# Patient Record
Sex: FEMALE | Race: WHITE | NOT HISPANIC OR LATINO | URBAN - METROPOLITAN AREA
[De-identification: names, ages, dates, MRNs, and addresses within clinical notes are randomized per-mention and may not be internally consistent; named-entity substitution may affect disease eponyms.]

---

## 2017-04-10 ENCOUNTER — VIRTUAL VISIT (OUTPATIENT)
Dept: FAMILY MEDICINE | Facility: OTHER | Age: 42
End: 2017-04-10

## 2017-04-10 NOTE — PROGRESS NOTES
Date: [id number]  Clinician: Joel Wegener  Clinician NPI: [id number]  Patient: Yolanda Meyer  Patient : 1975  Patient Address: [address] [place] [address]  Patient Phone: [phone]  Visit Protocol: Motion sickness  Patient Summary:  Yolanda is a 42 year old ( : 1975 } female who initiated a Zip for motion sickness prevention.      She plans to travel by airplane and travel by boat or sea. Treatment is requested for 3-4 days. During this time Yolanda expects to snorkel.  Yolanda has experienced motion sickness in the past while traveling by plane and boating or traveling by sea.   Yolanda has been previously treated for motion sickness. The previous bouts of motion sickness have included nausea or vomiting and cold sweats or sweating.   She does not have a history of migraines. Yolanda denies hypertension.   The patient denies taking potassium or vitamin c supplements. She also denies glaucoma, currently experiencing dizziness, nausea, a recent concussion or head injury, tinnitus, previous diagnosis or treatment for stroke or neurological disorder, and generally feeling ill on a regular basis.   Yolanda does not smoke or use smokeless tobacco She denies pregnancy and is Not currently breastfeeding.    Previous Medications:  Dramamine (dimenhydrinate) was effective.    Drug Preferences:  Prefers to NOT be treated with Dramamine (dimenhydrinate).   MEDICATIONS:  Paroxetine (Paxil, Pexeva)   , ALLERGIES:   sulfa (Bactrim/Septra)   Patient free text response:   None    Clinician Response:  Dear Yolanda,   Based upon the information you provided, you most likely have Motion Sickness.   I am prescribing:   Meclizine (Antivert). Take 1-2 tablets by mouth one time a day 1 hour before travel or event.   Once motion sickness symptoms start, it can take 36-72 hours to feel better, even if the motion has stopped.   It is easier to prevent motion sickness, than to relieve the symptoms after  they start. For prevention or mild symptoms try the following:     Eat a few dry soda crackers.    Sip on small amounts of clear, carbonated beverages such as ginger ale    Get fresh air    Lie down or keep your head still    Sit or lay in the most stable area of the vehicle. In a plane, try to sit over a wing. When in a car, you should be the . If you cannot drive, you should be the front seat passenger and concentrate on the horizon. When traveling by ship, the most stable area is near the middle, so try to book a cabin in that area. You should also stay on the deck looking at the horizon or lay down your cabin or in another area near the center of the ship.     I recommend:   Wearing an acupressure band, like a Sea-band or psi-Band. This may help with motion sickness symptoms such as nausea and vomiting. Do NOT combine over-the-counter motion-sickness medications with your prescription medications. Please seek medical attention if you have taken steps to help prevent motion sickness and you develop active motion sickness, your motion sickness does not resolve, you develop new or concerning symptoms (such as headache, weakness), or you develop nausea and vomiting and you are unable to keep down fluids.   If you become pregnant during this course of treatment with medication, stop taking the medication and contact your primary care provider.   Diagnosis: Motion sickness  Diagnosis ICD: T75.3XXA  Prescription: meclizine (Antivert) 25mg oral 30 tablets, 30 days supply. Take one to two tablets by mouth one time a day 1 hour before travel or event. Refills: 0, Refill as needed: no, Allow substitutions: yes  Prescription Sent At: April 10 10:18:37, 2017  Pharmacy: [facility]/pharmacy #[id number] - [phone] - [address] [place] [address]

## 2018-05-25 ENCOUNTER — RECORDS - HEALTHEAST (OUTPATIENT)
Dept: ADMINISTRATIVE | Facility: OTHER | Age: 43
End: 2018-05-25

## 2019-08-22 ENCOUNTER — COMMUNICATION - HEALTHEAST (OUTPATIENT)
Dept: TELEHEALTH | Facility: CLINIC | Age: 44
End: 2019-08-22

## 2019-08-22 ENCOUNTER — RECORDS - HEALTHEAST (OUTPATIENT)
Dept: MAMMOGRAPHY | Facility: CLINIC | Age: 44
End: 2019-08-22

## 2019-08-22 DIAGNOSIS — Z12.31 ENCOUNTER FOR SCREENING MAMMOGRAM FOR MALIGNANT NEOPLASM OF BREAST: ICD-10-CM

## 2020-07-04 ENCOUNTER — VIRTUAL VISIT (OUTPATIENT)
Dept: FAMILY MEDICINE | Facility: OTHER | Age: 45
End: 2020-07-04

## 2020-07-04 NOTE — PROGRESS NOTES
"Date: 2020 15:04:37  Clinician: Landen Eid  Clinician NPI: [id number]  Patient: Yolanda Meyer  Patient : 1975  Patient Address: [address] [place] [address]  Patient Phone: [phone]  Visit Protocol: URI  Patient Summary:  Yolanda is a 45 year old ( : 1975 ) female who initiated a Visit for COVID-19 (Coronavirus) evaluation and screening. When asked the question \"Please sign me up to receive news, health information and promotions from [employer].\", Yolanda responded \"No\".    Yolanda states her symptoms started today.   Her symptoms consist of malaise, chills, and a sore throat. Yolanda also feels feverish.   Symptom details     Sore throat: Yolanda reports having mild throat pain (1-3 on a 10 point pain scale), does not have exudate on her tonsils, and can swallow liquids. She is not sure if the lymph nodes in her neck are enlarged. A rash has not appeared on the skin since the sore throat started.     Temperature: Her current temperature is 98.0 degrees Fahrenheit.      Yolanda denies having wheezing, nausea, teeth pain, ageusia, diarrhea, vomiting, rhinitis, ear pain, headache, myalgias, anosmia, facial pain or pressure, cough, and nasal congestion. She also denies having recent facial or sinus surgery in the past 60 days and taking antibiotic medication in the past month. She is not experiencing dyspnea.   Precipitating events  Within the past week, Yolanda has not been exposed to someone with strep throat. She has not recently been exposed to someone with influenza. Yolanda has been in close contact with the following high risk individuals: immunocompromised people.   Pertinent COVID-19 (Coronavirus) information  In the past 14 days, Yolanda has not worked in a congregate living setting.   She either works or volunteers as a healthcare worker or a , or works or volunteers in a healthcare facility. 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 She lives with a healthcare worker.   Yolanda has had a close contact with a laboratory-confirmed COVID-19 patient within 14 days of symptom onset. She was not exposed at her work. Additional information about contact with COVID-19 (Coronavirus) patient as reported by the patient (free text): Covid positive patients in our unit- has proper PPE on   Pertinent medical history  Yolanda does not get yeast infections when she takes antibiotics.   Yolanda does not need a return to work/school note.   Weight: 120 lbs   Yolanda does not smoke or use smokeless tobacco.   She denies pregnancy and denies breastfeeding. She has menstruated in the past month.   Weight: 120 lbs    MEDICATIONS: Paxil oral, ALLERGIES: Sulfa (Sulfonamide Antibiotics)  Clinician Response:  Dear Yolanda,   Your symptoms show that you may have coronavirus (COVID-19). This illness can cause fever, cough and trouble breathing. Many people get a mild case and get better on their own. Some people can get very sick.  What should I do?  We would like to test you for this virus.   1. Please call [phone] to schedule your visit. Explain that you were referred by [facility] to have a COVID-19 test. Be ready to share your [facility] visit ID number.  The following will serve as your written order for this COVID Test, ordered by me, for the indication of suspected COVID [Z20.828]: The test will be ordered in [employer], our electronic health record, after you are scheduled. It will show as ordered and authorized by Dino Powers MD.  Order: COVID-19 (Coronavirus) PCR for SYMPTOMATIC testing from [facility].      2. When it's time for your COVID test:  Stay at least 6 feet away from others. (If someone will drive you to your test, stay in the backseat, as far away from the  as you can.)   Cover your mouth and nose with a mask, tissue or  "washcloth.  Go straight to the testing site. Don't make any stops on the way there or back.      3.Starting now: Stay home and away from others (self-isolate) until:   You've had no fever---and no medicine that reduces fever---for 3 full days (72 hours). And...   Your other symptoms have gotten better. For example, your cough or breathing has improved. And...   At least 10 days have passed since your symptoms started.       During this time, don't leave the house except for testing or medical care.   Stay in your own room, even for meals. Use your own bathroom if you can.   Stay away from others in your home. No hugging, kissing or shaking hands. No visitors.  Don't go to work, school or anywhere else.    Clean \"high touch\" surfaces often (doorknobs, counters, handles, etc.). Use a household cleaning spray or wipes. You'll find a full list of  on the EPA website: www.epa.gov/pesticide-registration/list-n-disinfectants-use-against-sars-cov-2.   Cover your mouth and nose with a mask, tissue or washcloth to avoid spreading germs.  Wash your hands and face often. Use soap and water.  Caregivers in these groups are at risk for severe illness due to COVID-19:  o People 65 years and older  o People who live in a nursing home or long-term care facility  o People with chronic disease (lung, heart, cancer, diabetes, kidney, liver, immunologic)  o People who have a weakened immune system, including those who:   Are in cancer treatment  Take medicine that weakens the immune system, such as corticosteroids  Had a bone marrow or organ transplant  Have an immune deficiency  Have poorly controlled HIV or AIDS  Are obese (body mass index of 40 or higher)  Smoke regularly   o Caregivers should wear gloves while washing dishes, handling laundry and cleaning bedrooms and bathrooms.  o Use caution when washing and drying laundry: Don't shake dirty laundry, and use the warmest water setting that you can.  o For more tips, go to " www.cdc.gov/coronavirus/2019-ncov/downloads/10Things.pdf.    4.Sign up for [employer]. We know it's scary to hear that you might have COVID-19. We want to track your symptoms to make sure you're okay over the next 2 weeks. Please look for an email from [employer]---this is a free, online program that we'll use to keep in touch. To sign up, follow the link in the email. Learn more at [email or web address]  How can I take care of myself?   Get lots of rest. Drink extra fluids (unless a doctor has told you not to).   Take Tylenol (acetaminophen) for fever or pain. If you have liver or kidney problems, ask your family doctor if it's okay to take Tylenol.   Adults can take either:    650 mg (two 325 mg pills) every 4 to 6 hours, or...   1,000 mg (two 500 mg pills) every 8 hours as needed.    Note: Don't take more than 3,000 mg in one day. Acetaminophen is found in many medicines (both prescribed and over-the-counter medicines). Read all labels to be sure you don't take too much.   For children, check the Tylenol bottle for the right dose. The dose is based on the child's age or weight.    If you have other health problems (like cancer, heart failure, an organ transplant or severe kidney disease): Call your specialty clinic if you don't feel better in the next 2 days.       Know when to call 911. Emergency warning signs include:    Trouble breathing or shortness of breath Pain or pressure in the chest that doesn't go away Feeling confused like you haven't felt before, or not being able to wake up Bluish-colored lips or face.  Where can I get more information?    [facility] -- About COVID-19: [email or web address]   CDC -- What to Do If You're Sick: www.cdc.gov/coronavirus/2019-ncov/about/steps-when-sick.html   CDC -- Ending Home Isolation: www.cdc.gov/coronavirus/2019-ncov/hcp/disposition-in-home-patients.html   CDC -- Caring for Someone:  www.cdc.gov/coronavirus/2019-ncov/if-you-are-sick/care-for-someone.html   [facility] -- Interim Guidance for Hospital Discharge to Home: www.health.[facility].[place].us/diseases/coronavirus/hcp/hospdischarge.pdf   [facility] clinical trials (COVID-19 research studies): clinicalaffairs.[facility]-clinical-trials    Below are the COVID-19 hotlines at the [place] Department of Health ([facility]). Interpreters are available.    For health questions: Call 257-037-3906 or 1-296.195.1678 (7 a.m. to 7 p.m.) For questions about schools and childcare: Call 218-816-8502 or 1-506.564.1121 (7 a.m. to 7 p.m.)    Diagnosis: Other malaise  Diagnosis ICD: R53.81

## 2021-01-22 ENCOUNTER — HOSPITAL ENCOUNTER (OUTPATIENT)
Dept: MAMMOGRAPHY | Facility: CLINIC | Age: 46
Discharge: HOME | End: 2021-01-22
Attending: OBSTETRICS & GYNECOLOGY

## 2021-01-22 DIAGNOSIS — Z12.31 VISIT FOR SCREENING MAMMOGRAM: ICD-10-CM

## 2021-06-05 ENCOUNTER — HEALTH MAINTENANCE LETTER (OUTPATIENT)
Age: 46
End: 2021-06-05

## 2021-09-25 ENCOUNTER — HEALTH MAINTENANCE LETTER (OUTPATIENT)
Age: 46
End: 2021-09-25

## 2022-05-01 ENCOUNTER — HEALTH MAINTENANCE LETTER (OUTPATIENT)
Age: 47
End: 2022-05-01

## 2022-06-26 ENCOUNTER — HEALTH MAINTENANCE LETTER (OUTPATIENT)
Age: 47
End: 2022-06-26

## 2022-12-26 ENCOUNTER — HEALTH MAINTENANCE LETTER (OUTPATIENT)
Age: 47
End: 2022-12-26

## 2023-02-09 ENCOUNTER — LAB REQUISITION (OUTPATIENT)
Dept: LAB | Facility: CLINIC | Age: 48
End: 2023-02-09

## 2023-02-09 DIAGNOSIS — R53.83 OTHER FATIGUE: ICD-10-CM

## 2023-02-09 DIAGNOSIS — N95.1 MENOPAUSAL AND FEMALE CLIMACTERIC STATES: ICD-10-CM

## 2023-02-09 LAB
FSH SERPL IRP2-ACNC: 3.7 MIU/ML
TSH SERPL DL<=0.005 MIU/L-ACNC: 1.61 UIU/ML (ref 0.3–4.2)

## 2023-02-09 PROCEDURE — 84443 ASSAY THYROID STIM HORMONE: CPT | Performed by: OBSTETRICS & GYNECOLOGY

## 2023-02-09 PROCEDURE — 83001 ASSAY OF GONADOTROPIN (FSH): CPT | Performed by: OBSTETRICS & GYNECOLOGY

## 2023-03-02 ENCOUNTER — LAB REQUISITION (OUTPATIENT)
Dept: LAB | Facility: CLINIC | Age: 48
End: 2023-03-02

## 2023-03-02 DIAGNOSIS — E78.5 HYPERLIPIDEMIA, UNSPECIFIED: ICD-10-CM

## 2023-03-02 DIAGNOSIS — M25.50 PAIN IN UNSPECIFIED JOINT: ICD-10-CM

## 2023-03-02 DIAGNOSIS — Z86.19 PERSONAL HISTORY OF OTHER INFECTIOUS AND PARASITIC DISEASES: ICD-10-CM

## 2023-03-02 DIAGNOSIS — R53.83 OTHER FATIGUE: ICD-10-CM

## 2023-03-02 LAB
ALBUMIN SERPL BCG-MCNC: 4.3 G/DL (ref 3.5–5.2)
ALP SERPL-CCNC: 50 U/L (ref 35–104)
ALT SERPL W P-5'-P-CCNC: 9 U/L (ref 10–35)
ANION GAP SERPL CALCULATED.3IONS-SCNC: 9 MMOL/L (ref 7–15)
AST SERPL W P-5'-P-CCNC: 20 U/L (ref 10–35)
BASOPHILS # BLD AUTO: 0 10E3/UL (ref 0–0.2)
BASOPHILS NFR BLD AUTO: 0 %
BILIRUB SERPL-MCNC: 0.9 MG/DL
BUN SERPL-MCNC: 12.8 MG/DL (ref 6–20)
CALCIUM SERPL-MCNC: 9.4 MG/DL (ref 8.6–10)
CHLORIDE SERPL-SCNC: 102 MMOL/L (ref 98–107)
CHOLEST SERPL-MCNC: 206 MG/DL
CREAT SERPL-MCNC: 0.68 MG/DL (ref 0.51–0.95)
CRP SERPL-MCNC: <3 MG/L
DEPRECATED HCO3 PLAS-SCNC: 26 MMOL/L (ref 22–29)
EOSINOPHIL # BLD AUTO: 0 10E3/UL (ref 0–0.7)
EOSINOPHIL NFR BLD AUTO: 1 %
ERYTHROCYTE [DISTWIDTH] IN BLOOD BY AUTOMATED COUNT: 11.9 % (ref 10–15)
ERYTHROCYTE [SEDIMENTATION RATE] IN BLOOD BY WESTERGREN METHOD: 6 MM/HR (ref 0–20)
FERRITIN SERPL-MCNC: 119 NG/ML (ref 6–175)
GFR SERPL CREATININE-BSD FRML MDRD: >90 ML/MIN/1.73M2
GLUCOSE SERPL-MCNC: 105 MG/DL (ref 70–99)
HBA1C MFR BLD: 5.5 %
HCT VFR BLD AUTO: 42.8 % (ref 35–47)
HDLC SERPL-MCNC: 52 MG/DL
HGB BLD-MCNC: 14.6 G/DL (ref 11.7–15.7)
HOLD SPECIMEN: NORMAL
HOLD SPECIMEN: NORMAL
IMM GRANULOCYTES # BLD: 0 10E3/UL
IMM GRANULOCYTES NFR BLD: 1 %
LDLC SERPL CALC-MCNC: 141 MG/DL
LYMPHOCYTES # BLD AUTO: 1.3 10E3/UL (ref 0.8–5.3)
LYMPHOCYTES NFR BLD AUTO: 34 %
MCH RBC QN AUTO: 33.7 PG (ref 26.5–33)
MCHC RBC AUTO-ENTMCNC: 34.1 G/DL (ref 31.5–36.5)
MCV RBC AUTO: 99 FL (ref 78–100)
MONOCYTES # BLD AUTO: 0.3 10E3/UL (ref 0–1.3)
MONOCYTES NFR BLD AUTO: 7 %
NEUTROPHILS # BLD AUTO: 2.1 10E3/UL (ref 1.6–8.3)
NEUTROPHILS NFR BLD AUTO: 57 %
NONHDLC SERPL-MCNC: 154 MG/DL
NRBC # BLD AUTO: 0 10E3/UL
NRBC BLD AUTO-RTO: 0 /100
PLATELET # BLD AUTO: 210 10E3/UL (ref 150–450)
POTASSIUM SERPL-SCNC: 4.5 MMOL/L (ref 3.4–5.3)
PROT SERPL-MCNC: 6.7 G/DL (ref 6.4–8.3)
RBC # BLD AUTO: 4.33 10E6/UL (ref 3.8–5.2)
SODIUM SERPL-SCNC: 137 MMOL/L (ref 136–145)
TRIGL SERPL-MCNC: 65 MG/DL
WBC # BLD AUTO: 3.7 10E3/UL (ref 4–11)

## 2023-03-02 PROCEDURE — 80061 LIPID PANEL: CPT | Performed by: OBSTETRICS & GYNECOLOGY

## 2023-03-02 PROCEDURE — 80053 COMPREHEN METABOLIC PANEL: CPT | Performed by: OBSTETRICS & GYNECOLOGY

## 2023-03-02 PROCEDURE — 83036 HEMOGLOBIN GLYCOSYLATED A1C: CPT | Performed by: OBSTETRICS & GYNECOLOGY

## 2023-03-02 PROCEDURE — 85652 RBC SED RATE AUTOMATED: CPT | Performed by: OBSTETRICS & GYNECOLOGY

## 2023-03-02 PROCEDURE — 82728 ASSAY OF FERRITIN: CPT | Performed by: OBSTETRICS & GYNECOLOGY

## 2023-03-02 PROCEDURE — 85025 COMPLETE CBC W/AUTO DIFF WBC: CPT | Performed by: OBSTETRICS & GYNECOLOGY

## 2023-03-02 PROCEDURE — 86140 C-REACTIVE PROTEIN: CPT | Performed by: OBSTETRICS & GYNECOLOGY

## 2023-04-13 ENCOUNTER — TRANSFERRED RECORDS (OUTPATIENT)
Dept: HEALTH INFORMATION MANAGEMENT | Facility: CLINIC | Age: 48
End: 2023-04-13

## 2023-06-02 ENCOUNTER — HEALTH MAINTENANCE LETTER (OUTPATIENT)
Age: 48
End: 2023-06-02

## 2023-07-09 ENCOUNTER — HEALTH MAINTENANCE LETTER (OUTPATIENT)
Age: 48
End: 2023-07-09

## 2024-02-08 RX ORDER — PAROXETINE 10 MG/1
10 TABLET, FILM COATED ORAL EVERY MORNING
COMMUNITY

## 2024-02-09 ENCOUNTER — ANESTHESIA EVENT (OUTPATIENT)
Dept: SURGERY | Facility: AMBULATORY SURGERY CENTER | Age: 49
End: 2024-02-09
Payer: COMMERCIAL

## 2024-02-12 ENCOUNTER — HOSPITAL ENCOUNTER (OUTPATIENT)
Facility: AMBULATORY SURGERY CENTER | Age: 49
Discharge: HOME | End: 2024-02-12
Attending: COLON & RECTAL SURGERY
Payer: COMMERCIAL

## 2024-02-12 ENCOUNTER — ANESTHESIA (OUTPATIENT)
Dept: SURGERY | Facility: AMBULATORY SURGERY CENTER | Age: 49
End: 2024-02-12
Payer: COMMERCIAL

## 2024-02-12 VITALS
OXYGEN SATURATION: 97 % | BODY MASS INDEX: 22.15 KG/M2 | HEART RATE: 55 BPM | TEMPERATURE: 97.6 F | HEIGHT: 63 IN | WEIGHT: 125 LBS | SYSTOLIC BLOOD PRESSURE: 115 MMHG | DIASTOLIC BLOOD PRESSURE: 72 MMHG | RESPIRATION RATE: 16 BRPM

## 2024-02-12 RX ORDER — LIDOCAINE HYDROCHLORIDE 10 MG/ML
INJECTION, SOLUTION EPIDURAL; INFILTRATION; INTRACAUDAL; PERINEURAL PRN
Status: DISCONTINUED | OUTPATIENT
Start: 2024-02-12 | End: 2024-02-12 | Stop reason: HOSPADM

## 2024-02-12 RX ORDER — OXYCODONE HYDROCHLORIDE 10 MG/1
10 TABLET ORAL
Status: DISCONTINUED | OUTPATIENT
Start: 2024-02-12 | End: 2024-02-13 | Stop reason: HOSPADM

## 2024-02-12 RX ORDER — ONDANSETRON 2 MG/ML
INJECTION INTRAMUSCULAR; INTRAVENOUS PRN
Status: DISCONTINUED | OUTPATIENT
Start: 2024-02-12 | End: 2024-02-12

## 2024-02-12 RX ORDER — ONDANSETRON 4 MG/1
4 TABLET, ORALLY DISINTEGRATING ORAL EVERY 30 MIN PRN
Status: DISCONTINUED | OUTPATIENT
Start: 2024-02-12 | End: 2024-02-13 | Stop reason: HOSPADM

## 2024-02-12 RX ORDER — LIDOCAINE 40 MG/G
CREAM TOPICAL
Status: DISCONTINUED | OUTPATIENT
Start: 2024-02-12 | End: 2024-02-13 | Stop reason: HOSPADM

## 2024-02-12 RX ORDER — PROPOFOL 10 MG/ML
INJECTION, EMULSION INTRAVENOUS CONTINUOUS PRN
Status: DISCONTINUED | OUTPATIENT
Start: 2024-02-12 | End: 2024-02-12

## 2024-02-12 RX ORDER — FENTANYL CITRATE 50 UG/ML
INJECTION, SOLUTION INTRAMUSCULAR; INTRAVENOUS PRN
Status: DISCONTINUED | OUTPATIENT
Start: 2024-02-12 | End: 2024-02-12

## 2024-02-12 RX ORDER — ONDANSETRON 4 MG/1
4 TABLET, ORALLY DISINTEGRATING ORAL
Status: DISCONTINUED | OUTPATIENT
Start: 2024-02-12 | End: 2024-02-13 | Stop reason: HOSPADM

## 2024-02-12 RX ORDER — ACETAMINOPHEN 325 MG/1
975 TABLET ORAL ONCE
Status: COMPLETED | OUTPATIENT
Start: 2024-02-12 | End: 2024-02-12

## 2024-02-12 RX ORDER — FENTANYL CITRATE 0.05 MG/ML
25 INJECTION, SOLUTION INTRAMUSCULAR; INTRAVENOUS
Status: DISCONTINUED | OUTPATIENT
Start: 2024-02-12 | End: 2024-02-13 | Stop reason: HOSPADM

## 2024-02-12 RX ORDER — ONDANSETRON 2 MG/ML
4 INJECTION INTRAMUSCULAR; INTRAVENOUS EVERY 30 MIN PRN
Status: DISCONTINUED | OUTPATIENT
Start: 2024-02-12 | End: 2024-02-13 | Stop reason: HOSPADM

## 2024-02-12 RX ORDER — OXYCODONE HYDROCHLORIDE 5 MG/1
5 TABLET ORAL
Status: DISCONTINUED | OUTPATIENT
Start: 2024-02-12 | End: 2024-02-13 | Stop reason: HOSPADM

## 2024-02-12 RX ORDER — PROPOFOL 10 MG/ML
INJECTION, EMULSION INTRAVENOUS PRN
Status: DISCONTINUED | OUTPATIENT
Start: 2024-02-12 | End: 2024-02-12

## 2024-02-12 RX ORDER — SODIUM CHLORIDE, SODIUM LACTATE, POTASSIUM CHLORIDE, CALCIUM CHLORIDE 600; 310; 30; 20 MG/100ML; MG/100ML; MG/100ML; MG/100ML
INJECTION, SOLUTION INTRAVENOUS CONTINUOUS
Status: DISCONTINUED | OUTPATIENT
Start: 2024-02-12 | End: 2024-02-13 | Stop reason: HOSPADM

## 2024-02-12 RX ORDER — DEXAMETHASONE SODIUM PHOSPHATE 4 MG/ML
INJECTION, SOLUTION INTRA-ARTICULAR; INTRALESIONAL; INTRAMUSCULAR; INTRAVENOUS; SOFT TISSUE PRN
Status: DISCONTINUED | OUTPATIENT
Start: 2024-02-12 | End: 2024-02-12

## 2024-02-12 RX ADMIN — ONDANSETRON 4 MG: 2 INJECTION INTRAMUSCULAR; INTRAVENOUS at 10:28

## 2024-02-12 RX ADMIN — SODIUM CHLORIDE, SODIUM LACTATE, POTASSIUM CHLORIDE, CALCIUM CHLORIDE: 600; 310; 30; 20 INJECTION, SOLUTION INTRAVENOUS at 09:10

## 2024-02-12 RX ADMIN — DEXAMETHASONE SODIUM PHOSPHATE 4 MG: 4 INJECTION, SOLUTION INTRA-ARTICULAR; INTRALESIONAL; INTRAMUSCULAR; INTRAVENOUS; SOFT TISSUE at 10:28

## 2024-02-12 RX ADMIN — PROPOFOL 40 MG: 10 INJECTION, EMULSION INTRAVENOUS at 10:19

## 2024-02-12 RX ADMIN — ACETAMINOPHEN 975 MG: 325 TABLET ORAL at 08:59

## 2024-02-12 RX ADMIN — PROPOFOL 20 MG: 10 INJECTION, EMULSION INTRAVENOUS at 10:20

## 2024-02-12 RX ADMIN — PROPOFOL 20 MG: 10 INJECTION, EMULSION INTRAVENOUS at 10:22

## 2024-02-12 RX ADMIN — FENTANYL CITRATE 50 MCG: 50 INJECTION, SOLUTION INTRAMUSCULAR; INTRAVENOUS at 10:22

## 2024-02-12 RX ADMIN — PROPOFOL 200 MCG/KG/MIN: 10 INJECTION, EMULSION INTRAVENOUS at 10:19

## 2024-02-12 RX ADMIN — FENTANYL CITRATE 50 MCG: 50 INJECTION, SOLUTION INTRAMUSCULAR; INTRAVENOUS at 10:26

## 2024-02-12 ASSESSMENT — LIFESTYLE VARIABLES: TOBACCO_USE: 1

## 2024-02-12 NOTE — OP NOTE
COL[facility] OPERATIVE NOTE    DATE OF SERVICE: 2/12/2024      PROCEDURE NAME: Rectal exam under anesthesia with botox injection     PRE-PROCEDURE DIAGNOSIS: anal fissure, rectal pain     POST-PROCEDURE DIAGNOSIS: acute anal fissure, rectal pain     SURGEON: Lorie Powers MD     FINDINGS: very superficial anal fissure posterior     ESTIMATED BLOOD LOSS: 0mL     ANESTHESIA: mac     SPECIMEN: None.     INDICATIONS FOR PROCEDURE:  Yolanda Meyer is a 49 year old female   presenting with recurrent pain and symptoms from suspected fissure.  The  risks and benefits of repeat botox injection were discussed with patient and informed consent was obtained.         DESCRIPTION OF PROCEDURE:  The patient was taken to the operating room and placed under MAC anesthesia. SHe was then placed in the prone hermila knife position on the operating room table. The buttocks were taped apart. The surgical area was then prepped and draped in the usual sterile fashion. A timeout was performed per protocol. We then started with injection of local anesthetic with lidocaine. A digital rectal exam was performed which revealed no abnormalities.  The bivalve anoscope was inserted and a superficial posterior skin fissure was seen. There was no evidence of a chronic fissure. I then injected 100 units of botox on either side of the anterior midline in the intersphincteric groove.      Hemostasis was achieved.  All sponge, needle and instrument counts  were correct at the end of the procedure. The patient tolerated the procedure well  and was awakened from anesthesia without difficulty, and transferred to the recovery room in stable condition.    Lorie Powers MD, MS, FACS, FASCRS  [facility]  Office: [phone]  2/12/2024 10:48 AM

## 2024-02-12 NOTE — ANESTHESIA CARE TRANSFER NOTE
Patient: Yolanda Meyer    Procedure: Procedure(s):  EXAM UNDER ANESTHEISA WITH BOTOX INJECTION       Diagnosis: Anal fissure [K60.2]  Diagnosis Additional Information: No value filed.    Anesthesia Type:   MAC     Note:    Oropharynx: oropharynx clear of all foreign objects and spontaneously breathing  Level of Consciousness: awake and drowsy  Oxygen Supplementation: nasal cannula  Level of Supplemental Oxygen (L/min / FiO2): 4  Independent Airway: airway patency satisfactory and stable  Dentition: dentition unchanged  Vital Signs Stable: post-procedure vital signs reviewed and stable  Report to RN Given: handoff report given  Patient transferred to: Phase II    Handoff Report: Identifed the Patient, Identified the Reponsible Provider, Reviewed the pertinent medical history, Discussed the surgical course, Reviewed Intra-OP anesthesia mangement and issues during anesthesia, Set expectations for post-procedure period and Allowed opportunity for questions and acknowledgement of understanding      Vitals:  Vitals Value Taken Time   /58 02/12/24 1039   Temp 36.1  C (97  F) 02/12/24 1039   Pulse 50 02/12/24  1039   Resp 16 02/12/24 1039   SpO2 99 % 02/12/24 1039       Electronically Signed By: WILFREDO Campos CRNA  February 12, 2024  10:40 AM

## 2024-02-12 NOTE — H&P
History and Physical Update    The history and physical has been reviewed and the patient has been examined.  There are no interim changes to the patient's history or physical condition.    Lorie Powers MD, MS  [facility]  Office: [phone]  Pager: [phone]  2/12/2024 10:01 AM

## 2024-02-12 NOTE — ANESTHESIA PREPROCEDURE EVALUATION
Anesthesia Pre-Procedure Evaluation    Patient: Yolanda Meyer   MRN: [id number] : 1975        Procedure : Procedure(s):  EXAM UNDER ANESTHEISA WITH BOTOX INJECTION          Past Medical History:   Diagnosis Date    Motion sickness     Other chronic pain     Pneumonia     PONV (postoperative nausea and vomiting)       Past Surgical History:   Procedure Laterality Date    MAMMOPLASTY AUGMENTATION  2002    Original set, saline    RECTAL BOTOX INJECTION      x2    ZZC ENLARGE BREAST      Description: Breast Surgery Enlargement Procedure;  Recorded: 2008;      Allergies   Allergen Reactions    Sulfa (Sulfonamide Antibiotics) [Sulfa Antibiotics] Hives      Social History     Tobacco Use    Smoking status: Former     Types: Cigarettes     Quit date:      Years since quitting: 15.1    Smokeless tobacco: Never   Substance Use Topics    Alcohol use: Yes     Comment: Wine approx 5x/wk      Wt Readings from Last 1 Encounters:   24 56.7 kg (125 lb)        Anesthesia Evaluation   Pt has had prior anesthetic.     History of anesthetic complications  - motion sickness and PONV.      ROS/MED HX  ENT/Pulmonary:     (+)                tobacco use, Past use,                       Neurologic:  - neg neurologic ROS     Cardiovascular:  - neg cardiovascular ROS     METS/Exercise Tolerance: >4 METS    Hematologic:  - neg hematologic  ROS     Musculoskeletal:       GI/Hepatic:       Renal/Genitourinary:       Endo:  - neg endo ROS     Psychiatric/Substance Use:    (-) chronic opioid use history   Infectious Disease:  - neg infectious disease ROS     Malignancy:       Other:      (+)  , H/O Chronic Pain,         Physical Exam    Airway        Mallampati: III   TM distance: > 3 FB   Neck ROM: full   Mouth opening: > 3 cm    Respiratory Devices and Support         Dental       (+) Minor Abnormalities - some fillings, tiny chips      Cardiovascular          Rhythm and rate: regular     Pulmonary           breath  "sounds clear to auscultation           OUTSIDE LABS:  CBC:   Lab Results   Component Value Date    WBC 3.7 (L) 03/02/2023    HGB 14.6 03/02/2023    HCT 42.8 03/02/2023     03/02/2023     BMP:   Lab Results   Component Value Date     03/02/2023    POTASSIUM 4.5 03/02/2023    CHLORIDE 102 03/02/2023    CO2 26 03/02/2023    BUN 12.8 03/02/2023    CR 0.68 03/02/2023     (H) 03/02/2023     COAGS: No results found for: \"PTT\", \"INR\", \"FIBR\"  POC: No results found for: \"BGM\", \"HCG\", \"HCGS\"  HEPATIC:   Lab Results   Component Value Date    ALBUMIN 4.3 03/02/2023    PROTTOTAL 6.7 03/02/2023    ALT 9 (L) 03/02/2023    AST 20 03/02/2023    ALKPHOS 50 03/02/2023    BILITOTAL 0.9 03/02/2023     OTHER:   Lab Results   Component Value Date    A1C 5.5 03/02/2023    ARVIND 9.4 03/02/2023    TSH 1.61 02/09/2023    SED 6 03/02/2023       Anesthesia Plan    ASA Status:  2    NPO Status:  NPO Appropriate    Anesthesia Type: MAC.   Induction: N/a.   Maintenance: TIVA.        Consents    Anesthesia Plan(s) and associated risks, benefits, and realistic alternatives discussed. Questions answered and patient/representative(s) expressed understanding.     - Discussed:     - Discussed with:  Patient            Postoperative Care    Pain management: Multi-modal analgesia.   PONV prophylaxis: Ondansetron (or other 5HT-3), Dexamethasone or Solumedrol     Comments:               Emili Horton MD                    "

## 2024-02-12 NOTE — PROGRESS NOTES
Patient discharged home with spouse.  AVS reviewed and all questions answered.  Vital signs stable. Patient and spouse comfortable with discharge plan. Surgeon to follow up with cream refill.     KALEB EDWARD RN on 2/12/2024 at 11:43 AM

## 2024-02-12 NOTE — DISCHARGE INSTRUCTIONS
If you have any questions or concerns regarding your procedure, please contact RAGINI Orta, her office number is [phone].     You have received 975 mg of Acetaminophen (Tylenol) at 09:00 am. Please do not take an additional dose of Tylenol until after 3:00 pm     Do not exceed 4,000 mg of acetaminophen during a 24 hour period and keep in mind that acetaminophen can also be found in many over-the-counter cold medications as well as narcotics that may be given for pain.     [facility]   Adult Discharge Orders & Instructions     For 24 hours after surgery    Get plenty of rest.  A responsible adult must stay with you for at least 24 hours after you leave the hospital.   Do not drive or use heavy equipment.  If you have weakness or tingling, don't drive or use heavy equipment until this feeling goes away.  Do not drink alcohol.  Avoid strenuous or risky activities.  Ask for help when climbing stairs.   You may feel lightheaded.  IF so, sit for a few minutes before standing.  Have someone help you get up.   If you have nausea (feel sick to your stomach): Drink only clear liquids such as apple juice, ginger ale, broth or 7-Up.  Rest may also help.  Be sure to drink enough fluids.  Move to a regular diet as you feel able.  You may have a slight fever. Call the doctor if your fever is over 100 F (37.7 C) (taken under the tongue) or lasts longer than 24 hours.  You may have a dry mouth, a sore throat, muscle aches or trouble sleeping.  These should go away after 24 hours.  Do not make important or legal decisions.   Call your doctor for any of the followin.  Signs of infection (fever, growing tenderness at the surgery site, a large amount of drainage or bleeding, severe pain, foul-smelling drainage, redness, swelling).    2. It has been over 8 to 10 hours since surgery and you are still not able to urinate (pass water).    3.  Headache for over 24 hours.

## 2024-02-12 NOTE — ANESTHESIA POSTPROCEDURE EVALUATION
Patient: Yolanda Meyer    Procedure: Procedure(s):  EXAM UNDER ANESTHEISA WITH BOTOX INJECTION       Anesthesia Type:  MAC    Note:  Disposition: Outpatient   Postop Pain Control: Uneventful            Sign Out: Well controlled pain   PONV: No   Neuro/Psych: Uneventful            Sign Out: Acceptable/Baseline neuro status   Airway/Respiratory: Uneventful            Sign Out: Acceptable/Baseline resp. status   CV/Hemodynamics: Uneventful            Sign Out: Acceptable CV status; No obvious hypovolemia; No obvious fluid overload   Other NRE: NONE   DID A NON-ROUTINE EVENT OCCUR? No           Last vitals:  Vitals Value Taken Time   /72 02/12/24 1111   Temp 97.6  F (36.4  C) 02/12/24 1109   Pulse 55 02/12/24 1111   Resp 16 02/12/24 1109   SpO2 97 % 02/12/24 1111       Electronically Signed By: Emili Horton MD  February 12, 2024  2:19 PM

## 2024-02-20 ENCOUNTER — TRANSFERRED RECORDS (OUTPATIENT)
Dept: HEALTH INFORMATION MANAGEMENT | Facility: CLINIC | Age: 49
End: 2024-02-20
Payer: COMMERCIAL

## 2024-02-28 NOTE — TELEPHONE ENCOUNTER
Diagnosis, Referred by & from: Anal Fissure   Appt date: 5/2/2024   NOTES STATUS DETAILS   OFFICE NOTE from referring provider N/A    OFFICE NOTE from other specialist Received / Care Everywhere [facility]:  1/30/24 - CR OV with Dr. Nugent  2/20/24 - PT OV with Erika Orellana PT  3/23/23 - CR OV with Dr. Powers    [facility]:  2/8/24 - PCC OV with Dr. Freeman   DISCHARGE SUMMARY from hospital N/A    DISCHARGE REPORT from the ER Care Everywhere [facility]:  3/26/23 - ED OV with Elizabeth Skinner NP   OPERATIVE REPORT Care Everywhere / [facility]:  2/12/24 - OP Note for EUA, BOTOX INJECTION with Dr. G[facility]:  10/25/23 - OP Note for EUA, BOTOX INJECTION with Dr. Powers  4/26/23 - OP Note for EUA, BOTOX INJECTION with Dr. Powers   MEDICATION LIST Received    LABS N/A    DIAGNOSTIC PROCEDURES     COLONOSCOPY (most recent all time after 5 years) Received [facility]:  4/13/23 - Colonoscopy   IMAGING (DISC & REPORT)      CT Received [facility]:  3/26/23 - CT Abd/Pelvis     Records Requested  02/28/24    Facility  [facility]  Fax: [phone]  [facility]  Fax: [phone]   Outcome * 2/28/24 9:53 AM Faxed request to [facility] and  for records and images to be sent to the clinic. - Kell    * 2/29/24 12:50 PM [facility] to be scanned into the chart. Images received from [facility] and attached to the patient in PACs. - Kell

## 2024-04-03 ENCOUNTER — TELEPHONE (OUTPATIENT)
Dept: SURGERY | Facility: CLINIC | Age: 49
End: 2024-04-03
Payer: COMMERCIAL

## 2024-04-03 NOTE — TELEPHONE ENCOUNTER
Left Voicemail (1st Attempt), sent myc for the patient to call back and reschedule the following:    Appointment type: New Patient  Provider: Rhianna López  Return date: Reschedule 5/2 Appt  Specialty phone number: [phone]  Additional appointment(s) needed: n/a  Additonal Notes: Reschedule pt with Dr. Clemons instead ([place] or [facility]), ok to split an NCA slot. Per staff message from Alaina Brown    Left direct #

## 2024-04-04 ENCOUNTER — TRANSFERRED RECORDS (OUTPATIENT)
Dept: HEALTH INFORMATION MANAGEMENT | Facility: CLINIC | Age: 49
End: 2024-04-04

## 2024-04-09 NOTE — TELEPHONE ENCOUNTER
Patient confirmed scheduled appointment:  Date: 5/9/24  Time: 3:30 pm  Visit type: New Patient  Provider: Dr. Clemons  Location: [place]  Testing/imaging: n/a  Additional notes: rescheduled 5/2 with Rhianna Campbell per message from Alaina Brown

## 2024-05-02 ENCOUNTER — PRE VISIT (OUTPATIENT)
Dept: SURGERY | Facility: CLINIC | Age: 49
End: 2024-05-02

## 2024-05-06 NOTE — PROGRESS NOTES
Colon and Rectal Surgery Consult Clinic Note     Referring provider:  Referred Self, MD  No address on file     RE: Yolanda Meyer  : 1975  PHUC: 2024    Reason for visit: anal fissure    HPI: Yolanda Meyer is a very pleasant 49 year old female with chronic anal fissure.     Patient was seen 2024 at [facility] for thrombosed hemorrhoid. Per the note the patient has had recurrent anal fissures since . Patient was previously seen by Dr. Powers 3/2023 and was continuing to have anal fissure symptoms despite treatment. Patient then underwent an EUA with botox 2023 and had improvement in symptoms but not full resolution. Patient then had repeat botox injection 10/2023. Per the note the patient was using Nifedipine once daily as well as sitz baths and a high fiber diet. Per the note she still did not have full resolution of fissure symptoms despite botox treatment and continued conservative management treatment. The plan per that note was to have repeat EUA with botox injection.     Yolanda has been battling with her anal fissure for about 3 years.  She has had 3 Botox injections, and she will start getting relief about 6 weeks after injection which will then last about 6 weeks.  She describes both typical anal fissure pain and some occasional spasming/neurologic pain.  She has had four visits with PFPT for her hypertonia.  She eats a high fiber diet, drinks plenty of water, and feels constipation is rare for her.  She is feeling better now but is concerned her Botox will wear off soon.  She has had two vaginal deliveries, with one vaccuum assist, but denies significant episiotomy or other sequela.  Most recent EUA with botox injecton done by Dr. Powers on 24  DESCRIPTION OF PROCEDURE:  The patient was taken to the operating room and placed under MAC anesthesia. She was then placed in the prone hermila knife position on the operating room table. The buttocks were taped apart. The surgical area was then  prepped and draped in the usual sterile fashion. A timeout was performed per protocol. We then started with injection of local anesthetic with lidocaine. A digital rectal exam was performed which revealed no abnormalities.  The bivalve anoscope was inserted and a superficial posterior skin fissure was seen. There was no evidence of a chronic fissure. I then injected 100 units of botox on either side of the anterior midline in the intersphincteric groove.       Hemostasis was achieved.  All sponge, needle and instrument counts  were correct at the end of the procedure. The patient tolerated the procedure well  and was awakened from anesthesia without difficulty, and transferred to the recovery room in stable condition.    Screening Colonoscopy (4/13/23)  Findings:  - normal finding. Location -ileum  - Rectal fissure  - Remainder of the exam is normal     Impression  Chronic anal fissure    Medical history:  Past Medical History:   Diagnosis Date    Motion sickness     Other chronic pain     Pneumonia     PONV (postoperative nausea and vomiting)        Surgical history:  Past Surgical History:   Procedure Laterality Date    CYSTOSCOPY, INJECT BOTOX N/A 2/12/2024    Procedure: EXAM UNDER ANESTHEISA WITH BOTOX INJECTION;  Surgeon: Lorie Powers MD;  Location: [place] [facility]    MAMMOPLASTY AUGMENTATION  01/01/2002    Original set, saline    RECTAL BOTOX INJECTION      x2    ZZC ENLARGE BREAST      Description: Breast Surgery Enlargement Procedure;  Recorded: 08/04/2008;       Problem list:    Patient Active Problem List    Diagnosis Date Noted    Anxiety      Priority: Medium     Created by Conversion  Replacement Utility updated for latest IMO load        Acne      Priority: Medium     Created by Conversion           Medications:  Current Outpatient Medications   Medication Sig Dispense Refill    PARoxetine (PAXIL) 10 MG tablet Take 10 mg by mouth every morning         Allergies:  Allergies   Allergen  Reactions    Sulfa (Sulfonamide Antibiotics) [Sulfa Antibiotics] Hives       Family history:  Family History   Problem Relation Age of Onset    Breast Cancer Sister        Social history:  Social History     Tobacco Use    Smoking status: Former     Current packs/day: 0.00     Types: Cigarettes     Quit date: 2009     Years since quitting: 15.3    Smokeless tobacco: Never   Substance Use Topics    Alcohol use: Yes     Comment: Wine approx 5x/wk    Marital status: .  Occupation: N/A.    There are no exam notes on file for this visit.     Physical Examination:  There were no vitals taken for this visit.  General: Well appearing female, no acute distress      Perianal external examination: Exam was chaperoned by Shannon Adame clinc assistant    Perianal skin: intact.  Lesions: No.  Eversion of buttocks: There was evidence of an anal fissure. Details: Posterior midline, almost healed, with sentinel tag.  Skin tags or external hemorrhoids: Yes: Posterior midline sentinel tag, mild external hemorrhoids.  Digital rectal examination: Was performed.   Sphincter tone: Good to hypertonic (almost 3 months from Botox) with mild stenosis.  Good squeeze, equivocal push    Anoscopy: Was deferred    Assessment/Plan: 49 year old female with a significant past medical history of previous vaginal delivery with a vacuum  assist with a diagnosis of  chronic anal fissure.  She does get some improvement with Botox but this is short lived.  I feel she has demonstrated that she could tolerate a lateral internal sphincterotomy well.  We reviewed how this is the gold standard treatment for anal fissures, and that the procedure has been modified in recent years as well as being done more selectively to improve continence rates.  We reviewed the possibility of persistent fissure is slightly higher given these modifications.  Yolanda is considering proceeding in the near future to prevent recurrent symptoms.       30 minutes spent on the date  of encounter performing chart review, history and exam, documentation and further activities as noted above.     Kell Clemons MD     Division of Colon & Rectal Surgery  [facility]       This note was created using speech recognition software and may contain unintended word substitutions.

## 2024-05-09 ENCOUNTER — OFFICE VISIT (OUTPATIENT)
Dept: SURGERY | Facility: CLINIC | Age: 49
End: 2024-05-09
Payer: COMMERCIAL

## 2024-05-09 VITALS
SYSTOLIC BLOOD PRESSURE: 129 MMHG | RESPIRATION RATE: 18 BRPM | BODY MASS INDEX: 21.97 KG/M2 | OXYGEN SATURATION: 98 % | DIASTOLIC BLOOD PRESSURE: 85 MMHG | WEIGHT: 124 LBS | HEART RATE: 59 BPM

## 2024-05-09 DIAGNOSIS — K60.2 ANAL FISSURE: Primary | ICD-10-CM

## 2024-05-09 DIAGNOSIS — K60.1 CHRONIC ANAL FISSURE: Primary | ICD-10-CM

## 2024-05-09 PROCEDURE — 99203 OFFICE O/P NEW LOW 30 MIN: CPT | Performed by: COLON & RECTAL SURGERY

## 2024-05-09 NOTE — PATIENT INSTRUCTIONS
Follow up:    You can call Judith, our surgery scheduler, directly to start the scheduling process.    Appointment you will need in prep: pre op physical with our anesthesia team or your PCP    MORGAN Callaway [phone]    Clinic Fax Number [phone]    Surgery Scheduling (Judith) [phone]    My Chart is available 24 hours a day and is a secure way to access your records and communicate with your care team.  I strongly recommend signing up if you haven't already done so, if you are comfortable with computers.  If you would like to inquire about this or are having problems with My Chart access, you may call [phone] or go online at [email or web address].  Please allow at least 24 hours for a response and extra time on weekends and Holidays.

## 2024-05-10 ENCOUNTER — TELEPHONE (OUTPATIENT)
Dept: SURGERY | Facility: CLINIC | Age: 49
End: 2024-05-10
Payer: COMMERCIAL

## 2024-05-10 PROBLEM — K60.2 ANAL FISSURE: Status: ACTIVE | Noted: 2024-05-09

## 2024-05-10 NOTE — TELEPHONE ENCOUNTER
Patient is scheduled for surgery with Dr. Kell Clemons    Spoke with: Yolanda    Date of Surgery: Fri 5/31/2024    [facility]    Informed patient they will need an adult  YES    Pre op with Provider Dr. Kell Clemons    Upcoming Related Appointments:     Weds May 15, 2024  5:45 PM  Pre-op H&P  (Arrive by 5:30 PM) anesthesiology clinic  PAC EVALUATION with Andria Funk PA-C  [facility] Preoperative Assessment Center [facility] [phone]    VIDEO VISIT     Jun 20, 2024  3:30 PM   (Arrive by 3:15 PM)  Post Op with Kell Clemons MD  [facility] [place] ([facility] ) [phone] [address] [address]      Surgery packet: sending via [employer] and Mail     Judith Nieto  [place]-op Coordinator  [place]  Direct Phone: [phone]

## 2024-05-10 NOTE — TELEPHONE ENCOUNTER
FUTURE VISIT INFORMATION      SURGERY INFORMATION:  Date: 5/31/24  [facility]  Surgeon:  Kell Clemons MD   Anesthesia Type:  MAC  Procedure: Lateral SPHINCTEROTOMY   Consult: ov 5/9/24    RECORDS REQUESTED FROM:       Primary Care Provider: [facility]

## 2024-05-15 ENCOUNTER — PRE VISIT (OUTPATIENT)
Dept: SURGERY | Facility: CLINIC | Age: 49
End: 2024-05-15

## 2024-05-15 ENCOUNTER — ANESTHESIA EVENT (OUTPATIENT)
Dept: SURGERY | Facility: AMBULATORY SURGERY CENTER | Age: 49
End: 2024-05-15
Payer: COMMERCIAL

## 2024-05-15 ENCOUNTER — VIRTUAL VISIT (OUTPATIENT)
Dept: SURGERY | Facility: CLINIC | Age: 49
End: 2024-05-15
Payer: COMMERCIAL

## 2024-05-15 VITALS — BODY MASS INDEX: 22.15 KG/M2 | HEIGHT: 63 IN | WEIGHT: 125 LBS

## 2024-05-15 DIAGNOSIS — Z01.818 PRE-OP EVALUATION: Primary | ICD-10-CM

## 2024-05-15 PROCEDURE — 99202 OFFICE O/P NEW SF 15 MIN: CPT | Mod: 95 | Performed by: PHYSICIAN ASSISTANT

## 2024-05-15 RX ORDER — NIFEDIPINE
POWDER (GRAM) MISCELLANEOUS DAILY
COMMUNITY

## 2024-05-15 ASSESSMENT — LIFESTYLE VARIABLES: TOBACCO_USE: 1

## 2024-05-15 ASSESSMENT — PAIN SCALES - GENERAL: PAINLEVEL: NO PAIN (0)

## 2024-05-15 ASSESSMENT — ENCOUNTER SYMPTOMS: SEIZURES: 0

## 2024-05-15 NOTE — H&P
Pre-Operative H & P     CC:  Preoperative exam to assess for increased cardiopulmonary risk while undergoing surgery and anesthesia.    Date of Encounter: 5/15/2024  Primary Care Physician:  Chandrika Hidalgo     Reason for visit:   Encounter Diagnosis   Name Primary?    Pre-op evaluation Yes       HPI  Yolanda Meyer is a 49 year old female who presents for pre-operative H & P in preparation for  Procedure Information       Case: [id number] Date/Time: 05/31/24 1405    Procedure: LATERAL SPHINCTEROTOMY (Rectum)    Anesthesia type: MAC    Diagnosis: Anal fissure [K60.2]    Pre-op diagnosis: Anal fissure [K60.2]    Location: [place] [address] / [facility]    Providers: Kell Clemons MD            Patient is being evaluated for comorbid conditions of anxiety, h/o PONV, former tobacco use    Ms. Meyer has a known chronic anal fissure. She follows with colorectal surgery. She is s/p EUA with botox x3. She is now scheduled for the above procedure.     History is obtained from the patient and chart review    Hx of abnormal bleeding or anti-platelet use: denies    Menstrual history: No LMP recorded (lmp unknown). (Menstrual status: IUD).     Past Medical History  Past Medical History:   Diagnosis Date    Motion sickness     Other chronic pain     Pneumonia     PONV (postoperative nausea and vomiting)        Past Surgical History  Past Surgical History:   Procedure Laterality Date    CYSTOSCOPY, INJECT BOTOX N/A 2/12/2024    Procedure: EXAM UNDER ANESTHEISA WITH BOTOX INJECTION;  Surgeon: Lorie Powers MD;  Location: [facility] OR    MAMMOPLASTY AUGMENTATION  01/01/2002    Original set, saline    RECTAL BOTOX INJECTION      x2    ZZC ENLARGE BREAST      Description: Breast Surgery Enlargement Procedure;  Recorded: 08/04/2008;       Prior to Admission Medications  Current Outpatient Medications   Medication Sig Dispense Refill    NIFEdipine POWD powder  daily      PARoxetine (PAXIL) 10 MG tablet Take 10 mg by mouth every morning         Allergies  Allergies   Allergen Reactions    Sulfa (Sulfonamide Antibiotics) [Sulfa Antibiotics] Hives       Social History  Social History     Socioeconomic History    Marital status:      Spouse name: Not on file    Number of children: Not on file    Years of education: Not on file    Highest education level: Not on file   Occupational History    Not on file   Tobacco Use    Smoking status: Former     Current packs/day: 0.00     Types: Cigarettes     Quit date: 2009     Years since quitting: 15.3    Smokeless tobacco: Never   Substance and Sexual Activity    Alcohol use: Yes     Alcohol/week: 5.0 standard drinks of alcohol     Types: 5 Glasses of wine per week     Comment: Wine approx 5x/wk    Drug use: Never    Sexual activity: Not on file   Other Topics Concern    Not on file   Social History Narrative    Not on file     Social Determinants of Health     Financial Resource Strain: Not on file   Food Insecurity: Not on file   Transportation Needs: Not on file   Physical Activity: Not on file   Stress: Not on file   Social Connections: Not on file   Interpersonal Safety: Not on file   Housing Stability: Not on file       Family History  Family History   Problem Relation Age of Onset    Breast Cancer Sister     Anesthesia Reaction No family hx of     Deep Vein Thrombosis (DVT) No family hx of        Review of Systems  The complete review of systems is negative other than noted in the HPI or here.   Anesthesia Evaluation   Pt has had prior anesthetic.     History of anesthetic complications  - PONV.      ROS/MED HX  ENT/Pulmonary:     (+)                tobacco use, Past use,                       Neurologic:  - neg neurologic ROS  (-) no seizures and no CVA   Cardiovascular:    (-) taking anticoagulants/antiplatelets   METS/Exercise Tolerance: >4 METS Comment: Yoga, runner, weight lifting    Hematologic:  - neg hematologic   ROS  (-) history of blood clots and history of blood transfusion   Musculoskeletal:       GI/Hepatic: Comment: Anal fissure    (-) GERD   Renal/Genitourinary:  - neg Renal ROS     Endo:  - neg endo ROS  (-) chronic steroid usage   Psychiatric/Substance Use:     (+) psychiatric history anxiety       Infectious Disease:  - neg infectious disease ROS     Malignancy:       Other:            Virtual visit -  No vitals were obtained    Physical Exam  Constitutional: Awake, alert, cooperative, no apparent distress, and appears stated age.  HENT: Normocephalic  Respiratory: non labored breathing   Neurologic: Awake, alert, oriented to name, place and time.   Neuropsychiatric: Calm, cooperative. Normal affect.      Prior Labs/Diagnostic Studies   All labs and imaging personally reviewed     EKG/ stress test - if available please see in ROS above   No results found.       No data to display                  The patient's records and results personally reviewed by this provider.     Outside records reviewed from: Care Everywhere      Assessment    Yolanda Meyer is a 49 year old female seen as a PAC referral for risk assessment and optimization for anesthesia.    Plan/Recommendations  Pt will be optimized for the proposed procedure.  See below for details on the assessment, risk, and preoperative recommendations    NEUROLOGY  - No history of TIA, CVA or seizure  -Post Op delirium risk factors:  No risk identified    ENT  - No current airway concerns.  Will need to be reassessed day of surgery.  Mallampati: Unable to assess  TM: Unable to assess    CARDIAC  - No history of CAD, Hypertension, and Afib  -denies cardiac symptoms   - METS (Metabolic Equivalents)  Patient performs 4 or more METS exercise without symptoms             Total Score: 0      RCRI-Very low risk: Class 1 0.4% complication rate             Total Score: 0        PULMONARY  MARYLOU Low Risk             Total Score: 0      - Denies asthma or inhaler use  -  "Tobacco History    History   Smoking Status    Former    Types: Cigarettes   Smokeless Tobacco    Never       GI  -anal fissure with the above procedure planned  PONV High Risk  Total Score: 4           1 AN PONV: Pt is Female    1 AN PONV: Patient is not a current smoker    1 AN PONV: Patient has history of PONV    1 AN PONV: Intended Post Op Opioids        /RENAL  - Baseline Creatinine  WNL    ENDOCRINE    - BMI: Estimated body mass index is 22.14 kg/m  as calculated from the following:    Height as of this encounter: 1.6 m (5' 3\").    Weight as of this encounter: 56.7 kg (125 lb).  Healthy Weight (BMI 18.5-24.9)  - No history of Diabetes Mellitus    HEME  VTE Low Risk 0.26%             Total Score: 0        Different anesthesia methods/types have been discussed with the patient, but they are aware that the final plan will be decided by the assigned anesthesia provider on the date of service.    The patient is optimized for their procedure. AVS with information on surgery time/arrival time, meds and NPO status given by nursing staff. No further diagnostic testing indicated.    Please refer to the physical examination documented by the anesthesiologist in the anesthesia record on the day of surgery.    Video-Visit Details    Type of service:  Video Visit    Provider received verbal consent for a Video Visit from the patient? Yes     Originating Location (pt. Location): Home    Distant Location (provider location):  Off-site  Mode of Communication:  Video Conference via [employer]  On the day of service:     Prep time: 5 minutes  Visit time: 6 minutes  Documentation time: 4 minutes  ------------------------------------------  Total time: 15 minutes      Andria Funk PA-C  Preoperative Assessment Center  [facility]  Clinic and Surgery Center  Phone: [phone]  Fax: [phone]    "

## 2024-05-15 NOTE — PROGRESS NOTES
Yolanda is a 49 year old who is being evaluated via a billable video visit.    How would you like to obtain your AVS? MyChart  If the video visit is dropped, the invitation should be resent by: Text to cell phone: [phone]

## 2024-05-30 ENCOUNTER — MYC MEDICAL ADVICE (OUTPATIENT)
Dept: SURGERY | Facility: AMBULATORY SURGERY CENTER | Age: 49
End: 2024-05-30
Payer: COMMERCIAL

## 2024-05-31 ENCOUNTER — HOSPITAL ENCOUNTER (OUTPATIENT)
Facility: AMBULATORY SURGERY CENTER | Age: 49
Discharge: HOME | End: 2024-05-31
Attending: COLON & RECTAL SURGERY
Payer: COMMERCIAL

## 2024-05-31 ENCOUNTER — ANESTHESIA (OUTPATIENT)
Dept: SURGERY | Facility: AMBULATORY SURGERY CENTER | Age: 49
End: 2024-05-31
Payer: COMMERCIAL

## 2024-05-31 VITALS
DIASTOLIC BLOOD PRESSURE: 86 MMHG | SYSTOLIC BLOOD PRESSURE: 125 MMHG | TEMPERATURE: 96.9 F | RESPIRATION RATE: 16 BRPM | OXYGEN SATURATION: 100 % | WEIGHT: 125 LBS | HEART RATE: 72 BPM | BODY MASS INDEX: 22.15 KG/M2 | HEIGHT: 63 IN

## 2024-05-31 DIAGNOSIS — K60.2 ANAL FISSURE: Primary | ICD-10-CM

## 2024-05-31 LAB
HCG UR QL: NEGATIVE
INTERNAL QC OK POCT: NORMAL
POCT KIT EXPIRATION DATE: NORMAL
POCT KIT LOT NUMBER: NORMAL

## 2024-05-31 PROCEDURE — 46080 SPHNCTROTMY ANAL DIV SPHNCTR: CPT

## 2024-05-31 PROCEDURE — 88304 TISSUE EXAM BY PATHOLOGIST: CPT | Mod: 26 | Performed by: PATHOLOGY

## 2024-05-31 PROCEDURE — 88304 TISSUE EXAM BY PATHOLOGIST: CPT | Mod: TC | Performed by: COLON & RECTAL SURGERY

## 2024-05-31 PROCEDURE — 46080 SPHNCTROTMY ANAL DIV SPHNCTR: CPT | Performed by: ANESTHESIOLOGY

## 2024-05-31 PROCEDURE — 46080 SPHNCTROTMY ANAL DIV SPHNCTR: CPT | Performed by: NURSE ANESTHETIST, CERTIFIED REGISTERED

## 2024-05-31 PROCEDURE — 81025 URINE PREGNANCY TEST: CPT | Performed by: PATHOLOGY

## 2024-05-31 RX ORDER — ONDANSETRON 4 MG/1
4 TABLET, ORALLY DISINTEGRATING ORAL
Status: DISCONTINUED | OUTPATIENT
Start: 2024-05-31 | End: 2024-06-01 | Stop reason: HOSPADM

## 2024-05-31 RX ORDER — LIDOCAINE 40 MG/G
CREAM TOPICAL
Status: DISCONTINUED | OUTPATIENT
Start: 2024-05-31 | End: 2024-05-31 | Stop reason: HOSPADM

## 2024-05-31 RX ORDER — SODIUM CHLORIDE, SODIUM LACTATE, POTASSIUM CHLORIDE, CALCIUM CHLORIDE 600; 310; 30; 20 MG/100ML; MG/100ML; MG/100ML; MG/100ML
INJECTION, SOLUTION INTRAVENOUS CONTINUOUS
Status: DISCONTINUED | OUTPATIENT
Start: 2024-05-31 | End: 2024-05-31 | Stop reason: HOSPADM

## 2024-05-31 RX ORDER — OXYCODONE HYDROCHLORIDE 5 MG/1
5 TABLET ORAL
Status: DISCONTINUED | OUTPATIENT
Start: 2024-05-31 | End: 2024-06-01 | Stop reason: HOSPADM

## 2024-05-31 RX ORDER — LIDOCAINE HYDROCHLORIDE 10 MG/ML
INJECTION, SOLUTION EPIDURAL; INFILTRATION; INTRACAUDAL; PERINEURAL PRN
Status: DISCONTINUED | OUTPATIENT
Start: 2024-05-31 | End: 2024-05-31 | Stop reason: HOSPADM

## 2024-05-31 RX ORDER — PROPOFOL 10 MG/ML
INJECTION, EMULSION INTRAVENOUS CONTINUOUS PRN
Status: DISCONTINUED | OUTPATIENT
Start: 2024-05-31 | End: 2024-05-31

## 2024-05-31 RX ORDER — KETAMINE HYDROCHLORIDE 10 MG/ML
INJECTION INTRAMUSCULAR; INTRAVENOUS PRN
Status: DISCONTINUED | OUTPATIENT
Start: 2024-05-31 | End: 2024-05-31

## 2024-05-31 RX ORDER — ONDANSETRON 2 MG/ML
INJECTION INTRAMUSCULAR; INTRAVENOUS PRN
Status: DISCONTINUED | OUTPATIENT
Start: 2024-05-31 | End: 2024-05-31

## 2024-05-31 RX ORDER — ONDANSETRON 4 MG/1
4 TABLET, ORALLY DISINTEGRATING ORAL EVERY 30 MIN PRN
Status: DISCONTINUED | OUTPATIENT
Start: 2024-05-31 | End: 2024-06-01 | Stop reason: HOSPADM

## 2024-05-31 RX ORDER — OXYCODONE HYDROCHLORIDE 5 MG/1
5 TABLET ORAL
Status: COMPLETED | OUTPATIENT
Start: 2024-05-31 | End: 2024-05-31

## 2024-05-31 RX ORDER — ACETAMINOPHEN 325 MG/1
975 TABLET ORAL ONCE
Status: COMPLETED | OUTPATIENT
Start: 2024-05-31 | End: 2024-05-31

## 2024-05-31 RX ORDER — NALOXONE HYDROCHLORIDE 0.4 MG/ML
0.1 INJECTION, SOLUTION INTRAMUSCULAR; INTRAVENOUS; SUBCUTANEOUS
Status: DISCONTINUED | OUTPATIENT
Start: 2024-05-31 | End: 2024-06-01 | Stop reason: HOSPADM

## 2024-05-31 RX ORDER — DEXAMETHASONE SODIUM PHOSPHATE 10 MG/ML
4 INJECTION, SOLUTION INTRAMUSCULAR; INTRAVENOUS
Status: DISCONTINUED | OUTPATIENT
Start: 2024-05-31 | End: 2024-06-01 | Stop reason: HOSPADM

## 2024-05-31 RX ORDER — ACETAMINOPHEN 325 MG/1
325-650 TABLET ORAL EVERY 6 HOURS PRN
COMMUNITY

## 2024-05-31 RX ORDER — LIDOCAINE HYDROCHLORIDE 20 MG/ML
INJECTION, SOLUTION INFILTRATION; PERINEURAL PRN
Status: DISCONTINUED | OUTPATIENT
Start: 2024-05-31 | End: 2024-05-31

## 2024-05-31 RX ORDER — OXYCODONE HYDROCHLORIDE 5 MG/1
10 TABLET ORAL
Status: DISCONTINUED | OUTPATIENT
Start: 2024-05-31 | End: 2024-06-01 | Stop reason: HOSPADM

## 2024-05-31 RX ORDER — PROPOFOL 10 MG/ML
INJECTION, EMULSION INTRAVENOUS PRN
Status: DISCONTINUED | OUTPATIENT
Start: 2024-05-31 | End: 2024-05-31

## 2024-05-31 RX ORDER — ONDANSETRON 2 MG/ML
4 INJECTION INTRAMUSCULAR; INTRAVENOUS EVERY 30 MIN PRN
Status: DISCONTINUED | OUTPATIENT
Start: 2024-05-31 | End: 2024-06-01 | Stop reason: HOSPADM

## 2024-05-31 RX ORDER — OXYCODONE HYDROCHLORIDE 5 MG/1
5-10 TABLET ORAL
Qty: 12 TABLET | Refills: 0 | Status: SHIPPED | OUTPATIENT
Start: 2024-05-31

## 2024-05-31 RX ORDER — BUPIVACAINE HYDROCHLORIDE AND EPINEPHRINE 2.5; 5 MG/ML; UG/ML
INJECTION, SOLUTION INFILTRATION; PERINEURAL PRN
Status: DISCONTINUED | OUTPATIENT
Start: 2024-05-31 | End: 2024-05-31 | Stop reason: HOSPADM

## 2024-05-31 RX ADMIN — ACETAMINOPHEN 975 MG: 325 TABLET ORAL at 11:20

## 2024-05-31 RX ADMIN — KETAMINE HYDROCHLORIDE 10 MG: 10 INJECTION INTRAMUSCULAR; INTRAVENOUS at 11:52

## 2024-05-31 RX ADMIN — PROPOFOL 30 MG: 10 INJECTION, EMULSION INTRAVENOUS at 12:04

## 2024-05-31 RX ADMIN — KETAMINE HYDROCHLORIDE 10 MG: 10 INJECTION INTRAMUSCULAR; INTRAVENOUS at 11:58

## 2024-05-31 RX ADMIN — ONDANSETRON 4 MG: 2 INJECTION INTRAMUSCULAR; INTRAVENOUS at 11:58

## 2024-05-31 RX ADMIN — PROPOFOL 50 MG: 10 INJECTION, EMULSION INTRAVENOUS at 11:58

## 2024-05-31 RX ADMIN — OXYCODONE HYDROCHLORIDE 5 MG: 5 TABLET ORAL at 13:24

## 2024-05-31 RX ADMIN — PROPOFOL 100 MCG/KG/MIN: 10 INJECTION, EMULSION INTRAVENOUS at 11:58

## 2024-05-31 RX ADMIN — LIDOCAINE HYDROCHLORIDE 60 MG: 20 INJECTION, SOLUTION INFILTRATION; PERINEURAL at 11:58

## 2024-05-31 RX ADMIN — SODIUM CHLORIDE, SODIUM LACTATE, POTASSIUM CHLORIDE, CALCIUM CHLORIDE: 600; 310; 30; 20 INJECTION, SOLUTION INTRAVENOUS at 11:47

## 2024-05-31 ASSESSMENT — LIFESTYLE VARIABLES: TOBACCO_USE: 1

## 2024-05-31 ASSESSMENT — ENCOUNTER SYMPTOMS: SEIZURES: 0

## 2024-05-31 NOTE — ANESTHESIA PREPROCEDURE EVALUATION
Anesthesia Pre-Procedure Evaluation    Patient: Yolanda Meyer   MRN: [id number] : 1975        Procedure : Procedure(s):  LATERAL SPHINCTEROTOMY          Past Medical History:   Diagnosis Date    Motion sickness     Other chronic pain     Pneumonia     PONV (postoperative nausea and vomiting)       Past Surgical History:   Procedure Laterality Date    CYSTOSCOPY, INJECT BOTOX N/A 2024    Procedure: EXAM UNDER ANESTHEISA WITH BOTOX INJECTION;  Surgeon: Lorie Powers MD;  Location: [place] [facility] OR    MAMMOPLASTY AUGMENTATION  2002    Original set, saline    RECTAL BOTOX INJECTION      x2    ZZC ENLARGE BREAST      Description: Breast Surgery Enlargement Procedure;  Recorded: 2008;      Allergies   Allergen Reactions    Sulfa (Sulfonamide Antibiotics) [Sulfa Antibiotics] Hives      Social History     Tobacco Use    Smoking status: Former     Current packs/day: 0.00     Types: Cigarettes     Quit date:      Years since quitting: 15.4    Smokeless tobacco: Never   Substance Use Topics    Alcohol use: Yes     Alcohol/week: 5.0 standard drinks of alcohol     Types: 5 Glasses of wine per week     Comment: Wine approx 5x/wk      Wt Readings from Last 1 Encounters:   24 56.7 kg (125 lb)        Anesthesia Evaluation   Pt has had prior anesthetic.     History of anesthetic complications  - PONV.      ROS/MED HX  ENT/Pulmonary:     (+)                tobacco use, Past use,                       Neurologic:  - neg neurologic ROS  (-) no seizures and no CVA   Cardiovascular:    (-) taking anticoagulants/antiplatelets   METS/Exercise Tolerance: >4 METS Comment: Yoga, runner, weight lifting    Hematologic:  - neg hematologic  ROS  (-) history of blood clots and history of blood transfusion   Musculoskeletal:       GI/Hepatic: Comment: Anal fissure    (-) GERD   Renal/Genitourinary:  - neg Renal ROS     Endo:  - neg endo ROS  (-) chronic steroid usage   Psychiatric/Substance Use:     (+)  "psychiatric history anxiety       Infectious Disease:  - neg infectious disease ROS     Malignancy:       Other:            Physical Exam    Airway        Mallampati: II       Respiratory Devices and Support         Dental       (+) Minor Abnormalities - some fillings, tiny chips      Cardiovascular          Rhythm and rate: regular     Pulmonary                   OUTSIDE LABS:  CBC:   Lab Results   Component Value Date    WBC 3.7 (L) 03/02/2023    HGB 14.6 03/02/2023    HCT 42.8 03/02/2023     03/02/2023     BMP:   Lab Results   Component Value Date     03/02/2023    POTASSIUM 4.5 03/02/2023    CHLORIDE 102 03/02/2023    CO2 26 03/02/2023    BUN 12.8 03/02/2023    CR 0.68 03/02/2023     (H) 03/02/2023     COAGS: No results found for: \"PTT\", \"INR\", \"FIBR\"  POC:   Lab Results   Component Value Date    HCG Negative 05/31/2024     HEPATIC:   Lab Results   Component Value Date    ALBUMIN 4.3 03/02/2023    PROTTOTAL 6.7 03/02/2023    ALT 9 (L) 03/02/2023    AST 20 03/02/2023    ALKPHOS 50 03/02/2023    BILITOTAL 0.9 03/02/2023     OTHER:   Lab Results   Component Value Date    A1C 5.5 03/02/2023    ARVIND 9.4 03/02/2023    TSH 1.61 02/09/2023    SED 6 03/02/2023       Anesthesia Plan    ASA Status:  3    NPO Status:  NPO Appropriate    Anesthesia Type: MAC.     - Reason for MAC: immobility needed, straight local not clinically adequate              Consents    Anesthesia Plan(s) and associated risks, benefits, and realistic alternatives discussed. Questions answered and patient/representative(s) expressed understanding.     - Discussed:     - Discussed with:  Patient            Postoperative Care            Comments:               Edward Hadley MD    I have reviewed the pertinent notes and labs in the chart from the past 30 days and (re)examined the patient.  Any updates or changes from those notes are reflected in this note.                  "

## 2024-05-31 NOTE — OP NOTE
**This note replaces a dictated operative note.**    Operative Note  Yolanda Meyer  [address]   [place] [address]  49 year old  female      Preoperative Diagnosis: 1) Persistent left posterolateral anal fissure                        2) Smith tag    Postoperative Diagnosis: 1) Persistent left posterolateral anal fissure                          2) Smith tag    Procedure: 1) Exam under anesthesia                      2) Left lateral internal sphincterotomy in the bed of the fissure (approximately 15%)                      3) Excision of sentinel tag    Surgeon: Kell Clemons M.D.    Assistant: None    Anesthesia: MAC with local    EBL: minimal  - 3 ml    Findings: Chronic left posterolateral anal fissure with sentinel tag.  Engorged hemorrhoids from inflammation.  No atypia to suggest Crohn's    Specimens: 1) Smith tag      Complications: None    Indications: Yolanda Meyer is a 49 year old year old female who presents for operative treatment of a persistent anal fissure despite rounds of Botox treatment and optimal medical management. The patient understands the risks and benefits of the procedure, including a possibility of recurrence or change in anal continence, and agrees to proceed.    Procedure: The patient was brought into the operating room and placed under IV sedation by anesthesia. The patient was then placed in the prone hermila-knife position. The buttocks were then taped, prepped and draped in the usual fashion. A local block was then placed using 30 ml 1% lidocaine followed by 30 ml 1/4% marcaine with epinephrine.  Exam under anesthesia was then performed using the Ruiz bivalve retractor. Findings were noted as above.    Given the fissure appeared off the midline, to prevent an additional wound, I decided to perform the sphincterotomy in the bed of the fissure. The sentinel tag was excised with cautery.  The lateral internal sphincterotomy was then performed by  grasping the internal sphincter in the intersphincteric groove and dividing the distal 15% of the internal sphincter.  Part of the mucosal incision was oversewn with interrupted 3-o chromics for hemostasis.  The remainder of the wound was left open to heal by secondary intention.  By palpation, this appeared to relax the sphincter appropriately.  All wounds were re-inspected and found to have good hemostasis. The wounds were then cleaned and dressed with Surgicel fibrillar and dry gauze. The patient tolerated the procedure well without complications. Sponge and instrument count were correct times two at the end of the procedure.       Kell Clemons MD    Division of Colon & Rectal Surgery  [facility]  [phone] (p)  [phone] (cell)

## 2024-05-31 NOTE — ANESTHESIA CARE TRANSFER NOTE
Patient: Yolanda Meyer    Procedure: Procedure(s):  LATERAL SPHINCTEROTOMY, exam under anesthesia       Diagnosis: Anal fissure [K60.2]  Diagnosis Additional Information: No value filed.    Anesthesia Type:   MAC     Note:    Oropharynx: oropharynx clear of all foreign objects and spontaneously breathing  Level of Consciousness: drowsy  Oxygen Supplementation: room air    Independent Airway: airway patency satisfactory and stable  Dentition: dentition unchanged  Vital Signs Stable: post-procedure vital signs reviewed and stable  Report to RN Given: handoff report given  Patient transferred to: Phase II  Comments: Vital signs per nursing documentation.       Handoff Report: Identifed the Patient, Identified the Reponsible Provider, Reviewed the pertinent medical history, Discussed the surgical course, Reviewed Intra-OP anesthesia mangement and issues during anesthesia, Set expectations for post-procedure period and Allowed opportunity for questions and acknowledgement of understanding      Vitals:  Vitals Value Taken Time   BP     Temp     Pulse 62    Resp 13    SpO2 96%        Electronically Signed By: WILFREDO Gustafson CRNA  May 31, 2024  1:14 PM

## 2024-05-31 NOTE — ANESTHESIA POSTPROCEDURE EVALUATION
Patient: Yolanda Meyer    Procedure: Procedure(s):  LATERAL SPHINCTEROTOMY, exam under anesthesia       Anesthesia Type:  MAC    Note:  Disposition: Outpatient   Postop Pain Control: Uneventful            Sign Out: Well controlled pain   PONV: No   Neuro/Psych: Uneventful            Sign Out: Acceptable/Baseline neuro status   Airway/Respiratory: Uneventful            Sign Out: Acceptable/Baseline resp. status   CV/Hemodynamics: Uneventful            Sign Out: Acceptable CV status; No obvious hypovolemia; No obvious fluid overload   Other NRE: NONE   DID A NON-ROUTINE EVENT OCCUR?            Last vitals:  Vitals Value Taken Time   /86 05/31/24 1300   Temp 36.1  C (96.9  F) 05/31/24 1300   Pulse 72 05/31/24 1300   Resp 16 05/31/24 1300   SpO2 100 % 05/31/24 1300       Electronically Signed By: Edward Hadley MD  May 31, 2024  2:12 PM

## 2024-05-31 NOTE — DISCHARGE INSTRUCTIONS
[facility]  Home Care Following Anesthesia  For 24 hours after surgery:  Get plenty of rest.  A responsible adult must stay with you for at least 24 hours after you leave the surgery center.  Do not drive or use heavy equipment.  If you have weakness or tingling, don't drive or use heavy equipment until this feeling goes away.   Do not drink alcohol.   Avoid strenuous or risky activities.  Ask for help when climbing stairs.  You may feel lightheaded.  IF so, sit for a few minutes before standing.  Have someone help you get up.   If you have nausea (feel sick to your stomach): Drink only clear liquids such as apple juice, ginger ale, broth or 7-Up.  Rest may also help.  Be sure to drink enough fluids.  Move to a regular diet as you feel able.   You may have a slight fever.  Call the doctor if your fever is over 100 F (37.7 C) (taken under the tongue) or lasts longer than 24 hours.  You may have a dry mouth, a sore throat, muscle aches or trouble sleeping. These should go away after 24 hours.  Do not make important or legal decisions.   It is recommended to avoid smoking.               Tips for taking pain medications  To get the best pain relief possible, remember these points:  Take pain medications as directed, before pain becomes severe.  Pain medication can upset your stomach: taking it with food may help.  Constipation is a common side effect of pain medication. Drink plenty of  fluids.  Eat foods high in fiber. Take a stool softener if recommended by your doctor or pharmacist.  Do not drink alcohol, drive or operate machinery while taking pain medications.  Ask about other ways to control pain, such as with heat, ice or relaxation.    Tylenol/Acetaminophen Consumption    If you feel your pain relief is insufficient, you may take Tylenol/Acetaminophen in addition to your narcotic pain medication.   Be careful not to exceed 4,000 mg of Tylenol/Acetaminophen in a 24 hour  period from all sources.  If you are taking extra strength Tylenol/acetaminophen (500 mg), the maximum dose is 8 tablets in 24 hours.  If you are taking regular strength acetaminophen (325 mg), the maximum dose is 12 tablets in 24 hours.    Call a doctor for any of the following:  Signs of infection (fever, growing tenderness at the surgery site, a large amount of drainage or bleeding, severe pain, foul-smelling drainage, redness, swelling).  It has been over 8 to 10 hours since surgery and you are still not able to urinate (pass water).  Headache for over 24 hours.  Numbness, tingling or weakness the day after surgery (if you had spinal anesthesia).  Signs of Covid-19 infection (temperature over 100 degrees, shortness of breath, cough, loss of taste/smell, generalized body aches, persistent headache, chills, sore throat, nausea/vomiting/diarrhea)  Your doctor is:       Dr. Kell Clemons, Colon Rectal: [phone]               Or dial [phone] and ask for the resident on call for:  Colon Rectal  For emergency care, call the:  [place] [facility]:  [phone] (TTY for hearing impaired: [phone])

## 2024-06-03 LAB
PATH REPORT.COMMENTS IMP SPEC: NORMAL
PATH REPORT.COMMENTS IMP SPEC: NORMAL
PATH REPORT.FINAL DX SPEC: NORMAL
PATH REPORT.GROSS SPEC: NORMAL
PATH REPORT.MICROSCOPIC SPEC OTHER STN: NORMAL
PATH REPORT.RELEVANT HX SPEC: NORMAL
PHOTO IMAGE: NORMAL

## 2024-06-05 NOTE — TELEPHONE ENCOUNTER
Received call from Federica on [facility] Leave of Absence team. She says that she received patient's CRISTIAN paperwork, but that the surgery date with Dr. Clemons was not included. Additional Info is also needed to process the claim.    Please call back [facility] CRISTIAN Team back at tel number [phone], option 2.    Need the following info:  1. did surgery take place,   2. date of surgery,   3. next office visit date,   4. anticipated return to work date    Forwarded message to the Dr. Clemons's clinical team who work on Leave of Absence paperwork.     Judith Nieto  [place]-op Coordinator  [place]  Direct Phone: [phone]

## 2024-06-11 NOTE — PROGRESS NOTES
"Colon and Rectal Surgery Clinic Note    RE: Yolanda Meyer.  : 1975.  PHUC: 2024.    Reason for visit: post op. 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 Patient is now s/p EUA, left lateral internal sphincterotomy in the bed of the fissure, excision of sentinel tag done by me on 24.     Today, Yolanda is feeling well.  She is having some discomfort around a swollen skin tag.  Her spasms and severe fissure pain were immediately relieved.    SURGICAL PATH:  ANUS, BIOPSY OF SKIN TAG:  Benign squamous fibroepithelial polyp (\"skin/sentinel tag\")     Medical history:  Past Medical History:   Diagnosis Date    Motion sickness     Other chronic pain     Pneumonia     PONV (postoperative nausea and vomiting)        Surgical history:  Past Surgical History:   Procedure Laterality Date    CYSTOSCOPY, INJECT BOTOX N/A 2024    Procedure: EXAM UNDER ANESTHEISA WITH BOTOX INJECTION;  Surgeon: Lorie Powers MD;  Location: [place] [facility]    MAMMOPLASTY AUGMENTATION  2002    Original set, saline    RECTAL BOTOX INJECTION      x2    SPHINCTEROTOMY RECTUM N/A 2024    Procedure: LATERAL SPHINCTEROTOMY, exam under anesthesia;  Surgeon: Kell Clemons MD;  Location: [facility]    [facility] ENLARGE BREAST      Description: Breast Surgery Enlargement Procedure;  Recorded: 2008;       Family history:  Family History   Problem Relation Age of Onset    Breast Cancer Sister     Anesthesia Reaction No family hx of     Deep Vein Thrombosis (DVT) No family hx of        Medications:  Current Outpatient Medications   Medication Sig Dispense Refill    acetaminophen (TYLENOL) 325 MG tablet Take 325-650 mg by mouth every 6 hours as needed for mild pain      menthol-zinc oxide " (CALMOSEPTINE) 0.44-20.6 % OINT ointment Apply pea-size amount to anus and surrounding skin three times daily as needed for skin irritation. 71 g 0    NIFEdipine POWD powder daily      oxyCODONE (ROXICODONE) 5 MG tablet Take 1-2 tablets (5-10 mg) by mouth every 3 hours as needed for severe pain 12 tablet 0    PARoxetine (PAXIL) 10 MG tablet Take 10 mg by mouth every morning         Allergies:  Allergies   Allergen Reactions    Sulfa (Sulfonamide Antibiotics) [Sulfa Antibiotics] Hives       Social history:   Social History     Tobacco Use    Smoking status: Former     Current packs/day: 0.00     Types: Cigarettes     Quit date: 2009     Years since quitting: 15.4    Smokeless tobacco: Never   Substance Use Topics    Alcohol use: Yes     Alcohol/week: 5.0 standard drinks of alcohol     Types: 5 Glasses of wine per week     Comment: Wine approx 5x/wk     Marital status: .    Physical Examination:  LMP  (LMP Unknown) Chaperoned by Alaina Brown MA    General: Well hydrated. No acute distress.    Perianal external examination:  Perianal skin: intact.  Lesions: There is swelling around the fissure bed/tag excision site, but no obvious abscess or erythema    Digital rectal examination: Was deferred.      ASSESSMENT/PLAN  Doing well, pleased with results so far.  Flagyl TID x 10 days for inflammation.  Will see back in 3 weeks.    20 minutes spent on the date of encounter performing chart review, history and exam, documentation and further activities as noted above.     Kell Clemons MD     Division of Colon & Rectal Surgery  [facility]         Referring Provider:  No referring provider defined for this encounter.     Primary Care Provider:  Chandrika Hidalgo

## 2024-06-12 ENCOUNTER — OFFICE VISIT (OUTPATIENT)
Dept: SURGERY | Facility: CLINIC | Age: 49
End: 2024-06-12
Payer: COMMERCIAL

## 2024-06-12 VITALS
OXYGEN SATURATION: 100 % | HEIGHT: 63 IN | SYSTOLIC BLOOD PRESSURE: 121 MMHG | BODY MASS INDEX: 22.15 KG/M2 | DIASTOLIC BLOOD PRESSURE: 80 MMHG | WEIGHT: 125 LBS | HEART RATE: 72 BPM

## 2024-06-12 DIAGNOSIS — L91.8 INFLAMED SKIN TAG: Primary | ICD-10-CM

## 2024-06-12 PROCEDURE — 99024 POSTOP FOLLOW-UP VISIT: CPT | Performed by: COLON & RECTAL SURGERY

## 2024-06-12 RX ORDER — METRONIDAZOLE 500 MG/1
500 TABLET ORAL 3 TIMES DAILY
Qty: 30 TABLET | Refills: 0 | Status: SHIPPED | OUTPATIENT
Start: 2024-06-12 | End: 2024-06-22

## 2024-06-12 ASSESSMENT — PAIN SCALES - GENERAL: PAINLEVEL: NO PAIN (0)

## 2024-06-12 NOTE — NURSING NOTE
"Chief Complaint   Patient presents with    Post-op Visit       Vitals:    06/12/24 0950   BP: 121/80   BP Location: Left arm   Patient Position: Sitting   Cuff Size: Adult Regular   Pulse: 72   SpO2: 100%   Weight: 125 lb   Height: 5' 3\"       Body mass index is 22.14 kg/m .    Alaina Brown CMA    "

## 2024-06-12 NOTE — LETTER
"2024       RE: Yolanda Meyer  [address]  [place] [address]       Dear Colleague,    Thank you for referring your patient, Yoladna Meyer, to the [facility] [place] [facility]. Please see a copy of my visit note below.    Colon and Rectal Surgery Clinic Note    RE: Yolanda Meyer.  : 1975.  PHUC: 2024.    Reason for visit: post op. 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 Patient is now s/p EUA, left lateral internal sphincterotomy in the bed of the fissure, excision of sentinel tag done by me on 24.     Today, Yolanda is feeling well.  She is having some discomfort around a swollen skin tag.  Her spasms and severe fissure pain were immediately relieved.    SURGICAL PATH:  ANUS, BIOPSY OF SKIN TAG:  Benign squamous fibroepithelial polyp (\"skin/sentinel tag\")     Medical history:  Past Medical History:   Diagnosis Date    Motion sickness     Other chronic pain     Pneumonia     PONV (postoperative nausea and vomiting)        Surgical history:  Past Surgical History:   Procedure Laterality Date    CYSTOSCOPY, INJECT BOTOX N/A 2024    Procedure: EXAM UNDER ANESTHEISA WITH BOTOX INJECTION;  Surgeon: Lorie Powers MD;  Location: [place] [facility]    MAMMOPLASTY AUGMENTATION  2002    Original set, saline    RECTAL BOTOX INJECTION      x2    SPHINCTEROTOMY RECTUM N/A 2024    Procedure: LATERAL SPHINCTEROTOMY, exam under anesthesia;  Surgeon: Kell Clemons MD;  [place] [facility] [place]    [facility] ENLARGE BREAST      Description: Breast Surgery Enlargement Procedure;  Recorded: 2008;       Family history:  Family History   Problem " Relation Age of Onset    Breast Cancer Sister     Anesthesia Reaction No family hx of     Deep Vein Thrombosis (DVT) No family hx of        Medications:  Current Outpatient Medications   Medication Sig Dispense Refill    acetaminophen (TYLENOL) 325 MG tablet Take 325-650 mg by mouth every 6 hours as needed for mild pain      menthol-zinc oxide (CALMOSEPTINE) 0.44-20.6 % OINT ointment Apply pea-size amount to anus and surrounding skin three times daily as needed for skin irritation. 71 g 0    NIFEdipine POWD powder daily      oxyCODONE (ROXICODONE) 5 MG tablet Take 1-2 tablets (5-10 mg) by mouth every 3 hours as needed for severe pain 12 tablet 0    PARoxetine (PAXIL) 10 MG tablet Take 10 mg by mouth every morning         Allergies:  Allergies   Allergen Reactions    Sulfa (Sulfonamide Antibiotics) [Sulfa Antibiotics] Hives     Social history:   Social History     Tobacco Use    Smoking status: Former     Current packs/day: 0.00     Types: Cigarettes     Quit date: 2009     Years since quitting: 15.4    Smokeless tobacco: Never   Substance Use Topics    Alcohol use: Yes     Alcohol/week: 5.0 standard drinks of alcohol     Types: 5 Glasses of wine per week     Comment: Wine approx 5x/wk     Marital status: .    Physical Examination:  LMP  (LMP Unknown) Chaperoned by Alaina Brown MA    General: Well hydrated. No acute distress.    Perianal external examination:  Perianal skin: intact.  Lesions: There is swelling around the fissure bed/tag excision site, but no obvious abscess or erythema    Digital rectal examination: Was deferred.    ASSESSMENT/PLAN  Doing well, pleased with results so far.  Flagyl TID x 10 days for inflammation.  Will see back in 3 weeks.    20 minutes spent on the date of encounter performing chart review, history and exam, documentation and further activities as noted above.     Referring Provider:  No referring provider defined for this encounter.     Primary Care Provider:  Gwen  Chandrika Cabello    Again, thank you for allowing me to participate in the care of your patient.      Sincerely,    Kell Clemons MD

## 2024-06-13 DIAGNOSIS — K60.2 ANAL FISSURE: ICD-10-CM

## 2024-06-17 DIAGNOSIS — K59.4 RECTAL SPASM: ICD-10-CM

## 2024-06-17 DIAGNOSIS — K60.2 ANAL FISSURE: Primary | ICD-10-CM

## 2024-06-23 ENCOUNTER — HEALTH MAINTENANCE LETTER (OUTPATIENT)
Age: 49
End: 2024-06-23

## 2024-07-11 ENCOUNTER — OFFICE VISIT (OUTPATIENT)
Dept: SURGERY | Facility: CLINIC | Age: 49
End: 2024-07-11
Payer: COMMERCIAL

## 2024-07-11 VITALS
DIASTOLIC BLOOD PRESSURE: 78 MMHG | WEIGHT: 127 LBS | OXYGEN SATURATION: 98 % | SYSTOLIC BLOOD PRESSURE: 116 MMHG | HEART RATE: 67 BPM | BODY MASS INDEX: 22.5 KG/M2 | RESPIRATION RATE: 18 BRPM

## 2024-07-11 DIAGNOSIS — K60.1 CHRONIC ANAL FISSURE: Primary | ICD-10-CM

## 2024-07-11 PROCEDURE — 99213 OFFICE O/P EST LOW 20 MIN: CPT | Performed by: COLON & RECTAL SURGERY

## 2024-07-11 NOTE — LETTER
2024      Yolanda Meyer  [address]  [place] [address]      Dear Colleague,    Thank you for referring your patient, Yolanda Meyer, to the [facility] [place]. Please see a copy of my visit note below.    Colon and Rectal Surgery Consult Clinic Note    Date: 7/3/2024     Referring provider:  No referring provider defined for this encounter.     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 Patient is now s/p EUA, left lateral internal sphincterotomy in the bed of the fissure, excision of sentinel tag done by me on 24. I saw Yolanda in clinic 24 and she was having some discomfort around a swollen skin tag. I prescribed Flagyl TID x10 days for inflammation.     Today, Yolanda is feeling better but not 100%.  She feels the spasms and fissure pain is better.  The metronidazole helped her swelling but screwed up her bowels.  She is using kombucha and other prebiotic foods to work on this.  She still notes some perineal inflammation.    Medical history:  Past Medical History:   Diagnosis Date     Motion sickness      Other chronic pain      Pneumonia      PONV (postoperative nausea and vomiting)        Surgical history:  Past Surgical History:   Procedure Laterality Date     CYSTOSCOPY, INJECT BOTOX N/A 2024    Procedure: EXAM UNDER ANESTHEISA WITH BOTOX INJECTION;  Surgeon: Lorie Powers MD;  Location: [place] [facility]     MAMMOPLASTY AUGMENTATION  2002    Original set, saline     RECTAL BOTOX INJECTION      x2     SPHINCTEROTOMY RECTUM N/A 2024    Procedure: LATERAL SPHINCTEROTOMY, exam under anesthesia;  Surgeon: Kell Clemons,  MD;  Location: [facility] [place]     [facility] ENLARGE BREAST      Description: Breast Surgery Enlargement Procedure;  Recorded: 08/04/2008;       Problem list:    Patient Active Problem List    Diagnosis Date Noted     Anal fissure 05/09/2024     Priority: Medium     Anxiety      Priority: Medium     Created by Conversion  Replacement Utility updated for latest IMO load         Acne      Priority: Medium     Created by Conversion           Medications:  Current Outpatient Medications   Medication Sig Dispense Refill     acetaminophen (TYLENOL) 325 MG tablet Take 325-650 mg by mouth every 6 hours as needed for mild pain (Patient not taking: Reported on 6/12/2024)       menthol-zinc oxide (CALMOSEPTINE) 0.44-20.6 % OINT ointment Apply pea-size amount to anus and surrounding skin three times daily as needed for skin irritation. 71 g 0     NIFEdipine POWD powder daily (Patient not taking: Reported on 6/12/2024)       oxyCODONE (ROXICODONE) 5 MG tablet Take 1-2 tablets (5-10 mg) by mouth every 3 hours as needed for severe pain (Patient not taking: Reported on 6/12/2024) 12 tablet 0     PARoxetine (PAXIL) 10 MG tablet Take 10 mg by mouth every morning         Allergies:  Allergies   Allergen Reactions     Sulfa (Sulfonamide Antibiotics) [Sulfa Antibiotics] Hives       Family history:  Family History   Problem Relation Age of Onset     Breast Cancer Sister      Anesthesia Reaction No family hx of      Deep Vein Thrombosis (DVT) No family hx of        Social history:  Social History     Tobacco Use     Smoking status: Former     Current packs/day: 0.00     Types: Cigarettes     Quit date: 2009     Years since quitting: 15.5     Smokeless tobacco: Never   Substance Use Topics     Alcohol use: Yes     Alcohol/week: 5.0 standard drinks of alcohol     Types: 5 Glasses of wine per week     Comment: Wine approx 5x/wk    Marital status: .  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    There are no exam notes on file for this  visit.     Physical Examination:  LMP  (LMP Unknown)   General: Well appearing, no acute distress    Perianal external examination: Exam was chaperoned by tonya Boatengc assistant   Perianal skin: intact.  Lesions: There was a small area not epithelialized at previous fissure/sphincterotomy site.  This was treated with silver nitrate  Eversion of buttocks: There was not evidence of an anal fissure. Details: N/A.  Skin tags or external hemorrhoids: Yes: Mild, no swelling..  Digital rectal examination: Was performed.   Sphincter tone: Good.  No mass/fluctuance abnormality    Assessment/Plan: 49 year old female with a significant past medical history of anal fissure s/p LIS.  Recommend continue with prebiotics;  as her bowel movements become more regular I think her remaining symptoms will improve.  I agree with discontinuing PFPT at this time.  She will follow up if the residual symptoms do no resolve with more time and bowel regulation.       20 minutes spent on the date of encounter performing chart review, history and exam, documentation and further activities as noted above.     Kell Clemons MD     Division of Colon & Rectal Surgery  [facility]         This note was created using speech recognition software and may contain unintended word substitutions.      Again, thank you for allowing me to participate in the care of your patient.        Sincerely,        Kell Clemons MD

## 2024-07-13 ENCOUNTER — MYC REFILL (OUTPATIENT)
Dept: SURGERY | Facility: CLINIC | Age: 49
End: 2024-07-13
Payer: COMMERCIAL

## 2024-07-13 DIAGNOSIS — K60.2 ANAL FISSURE: ICD-10-CM

## 2024-07-30 DIAGNOSIS — K59.4 RECTAL SPASM: ICD-10-CM

## 2024-07-30 DIAGNOSIS — K60.2 ANAL FISSURE: Primary | ICD-10-CM

## 2024-07-30 RX ORDER — HYDROCORTISONE ACETATE 25 MG/1
25 SUPPOSITORY RECTAL 2 TIMES DAILY
Qty: 24 SUPPOSITORY | Refills: 0 | Status: SHIPPED | OUTPATIENT
Start: 2024-07-30

## 2025-01-14 ENCOUNTER — LAB REQUISITION (OUTPATIENT)
Dept: LAB | Facility: CLINIC | Age: 50
End: 2025-01-14

## 2025-01-14 DIAGNOSIS — Z12.4 ENCOUNTER FOR SCREENING FOR MALIGNANT NEOPLASM OF CERVIX: ICD-10-CM

## 2025-01-14 PROCEDURE — 87624 HPV HI-RISK TYP POOLED RSLT: CPT | Performed by: OBSTETRICS & GYNECOLOGY

## 2025-01-15 ENCOUNTER — PATIENT OUTREACH (OUTPATIENT)
Dept: CARE COORDINATION | Facility: CLINIC | Age: 50
End: 2025-01-15
Payer: COMMERCIAL

## 2025-01-16 LAB
HPV HR 12 DNA CVX QL NAA+PROBE: POSITIVE
HPV16 DNA CVX QL NAA+PROBE: NEGATIVE
HPV18 DNA CVX QL NAA+PROBE: NEGATIVE
HUMAN PAPILLOMA VIRUS FINAL DIAGNOSIS: ABNORMAL

## 2025-01-21 LAB
BKR AP ASSOCIATED HPV REPORT: ABNORMAL
BKR LAB AP GYN ADEQUACY: ABNORMAL
BKR LAB AP GYN INTERPRETATION: ABNORMAL
BKR LAB AP LMP: ABNORMAL
BKR LAB AP PREVIOUS ABNL DX: ABNORMAL
BKR LAB AP PREVIOUS ABNORMAL: ABNORMAL
PATH REPORT.COMMENTS IMP SPEC: ABNORMAL
PATH REPORT.COMMENTS IMP SPEC: ABNORMAL
PATH REPORT.RELEVANT HX SPEC: ABNORMAL

## 2025-05-10 ENCOUNTER — HEALTH MAINTENANCE LETTER (OUTPATIENT)
Age: 50
End: 2025-05-10

## 2025-07-12 ENCOUNTER — HEALTH MAINTENANCE LETTER (OUTPATIENT)
Age: 50
End: 2025-07-12

## 2025-08-22 ENCOUNTER — LAB REQUISITION (OUTPATIENT)
Dept: LAB | Facility: CLINIC | Age: 50
End: 2025-08-22

## 2025-08-22 DIAGNOSIS — R87.610 ATYPICAL SQUAMOUS CELLS OF UNDETERMINED SIGNIFICANCE ON CYTOLOGIC SMEAR OF CERVIX (ASC-US): ICD-10-CM

## 2025-08-22 DIAGNOSIS — R87.810 CERVICAL HIGH RISK HUMAN PAPILLOMAVIRUS (HPV) DNA TEST POSITIVE: ICD-10-CM

## 2025-08-22 PROCEDURE — G0145 SCR C/V CYTO,THINLAYER,RESCR: HCPCS | Performed by: OBSTETRICS & GYNECOLOGY

## 2025-08-22 PROCEDURE — 87624 HPV HI-RISK TYP POOLED RSLT: CPT | Performed by: OBSTETRICS & GYNECOLOGY

## 2025-08-27 LAB
BKR AP ASSOCIATED HPV REPORT: NORMAL
BKR LAB AP GYN ADEQUACY: NORMAL
BKR LAB AP GYN INTERPRETATION: NORMAL
BKR LAB AP LMP: NORMAL
BKR LAB AP PREVIOUS ABNL DX: NORMAL
BKR LAB AP PREVIOUS ABNORMAL: NORMAL
PATH REPORT.COMMENTS IMP SPEC: NORMAL
PATH REPORT.COMMENTS IMP SPEC: NORMAL
PATH REPORT.RELEVANT HX SPEC: NORMAL

## (undated) DEVICE — TAPE DURAPORE 3" SILK 1538-3

## (undated) DEVICE — DRAPE LAP W/ARMBOARD 29410

## (undated) DEVICE — SOL WATER IRRIG 500ML BOTTLE 2F7113

## (undated) DEVICE — LINEN TOWEL PACK X5 5464

## (undated) DEVICE — STRAP KNEE/BODY 31143004

## (undated) DEVICE — PREP POVIDONE-IODINE 10% SOLUTION 4OZ BOTTLE MDS093944

## (undated) DEVICE — PACK RECTAL KIT CUSTOM ASC

## (undated) DEVICE — DRSG GAUZE 4X4" TRAY 6939

## (undated) DEVICE — SYR 10ML FINGER CONTROL W/O NDL 309695

## (undated) DEVICE — GLOVE BIOGEL PI MICRO SZ 6.5 48565

## (undated) DEVICE — SU CHROMIC 3-0 FS-2 27" 636

## (undated) DEVICE — ESU PENCIL SMOKE EVAC W/ROCKER SWITCH 0703-047-000

## (undated) DEVICE — CUP AND LID 2PK 2OZ STERILE  SSK9006A

## (undated) DEVICE — ESU GROUND PAD ADULT W/CORD E7507

## (undated) DEVICE — NDL 25GA 1.5" 305838

## (undated) DEVICE — ESU CLEANER TIP 31142717

## (undated) DEVICE — SOL HYDROGEN PEROXIDE 3% 4OZ BOTTLE F0010

## (undated) DEVICE — Device

## (undated) RX ORDER — OXYCODONE HYDROCHLORIDE 5 MG/1
TABLET ORAL
Status: DISPENSED
Start: 2024-05-31

## (undated) RX ORDER — LIDOCAINE HYDROCHLORIDE 10 MG/ML
INJECTION, SOLUTION EPIDURAL; INFILTRATION; INTRACAUDAL; PERINEURAL
Status: DISPENSED
Start: 2024-05-31

## (undated) RX ORDER — PROPOFOL 10 MG/ML
INJECTION, EMULSION INTRAVENOUS
Status: DISPENSED
Start: 2024-05-31

## (undated) RX ORDER — BUPIVACAINE HYDROCHLORIDE 2.5 MG/ML
INJECTION, SOLUTION EPIDURAL; INFILTRATION; INTRACAUDAL
Status: DISPENSED
Start: 2024-05-31

## (undated) RX ORDER — ACETAMINOPHEN 325 MG/1
TABLET ORAL
Status: DISPENSED
Start: 2024-05-31